# Patient Record
Sex: MALE | Race: WHITE
[De-identification: names, ages, dates, MRNs, and addresses within clinical notes are randomized per-mention and may not be internally consistent; named-entity substitution may affect disease eponyms.]

---

## 2019-01-23 ENCOUNTER — HOSPITAL ENCOUNTER (INPATIENT)
Dept: HOSPITAL 84 - D.ICU | Age: 84
LOS: 13 days | Discharge: NURSING, REHAB OR LONG-TERM CARE | DRG: 64 | End: 2019-02-05
Attending: FAMILY MEDICINE | Admitting: FAMILY MEDICINE
Payer: MEDICARE

## 2019-01-23 VITALS — DIASTOLIC BLOOD PRESSURE: 70 MMHG | SYSTOLIC BLOOD PRESSURE: 113 MMHG

## 2019-01-23 VITALS — DIASTOLIC BLOOD PRESSURE: 64 MMHG | SYSTOLIC BLOOD PRESSURE: 101 MMHG

## 2019-01-23 VITALS — SYSTOLIC BLOOD PRESSURE: 116 MMHG | DIASTOLIC BLOOD PRESSURE: 81 MMHG

## 2019-01-23 VITALS
HEIGHT: 70 IN | WEIGHT: 201.01 LBS | HEIGHT: 70 IN | BODY MASS INDEX: 28.78 KG/M2 | BODY MASS INDEX: 28.78 KG/M2 | BODY MASS INDEX: 28.78 KG/M2 | BODY MASS INDEX: 28.78 KG/M2 | WEIGHT: 201.01 LBS

## 2019-01-23 VITALS — SYSTOLIC BLOOD PRESSURE: 108 MMHG | DIASTOLIC BLOOD PRESSURE: 71 MMHG

## 2019-01-23 VITALS — SYSTOLIC BLOOD PRESSURE: 105 MMHG | DIASTOLIC BLOOD PRESSURE: 73 MMHG

## 2019-01-23 VITALS — SYSTOLIC BLOOD PRESSURE: 113 MMHG | DIASTOLIC BLOOD PRESSURE: 70 MMHG

## 2019-01-23 DIAGNOSIS — N18.9: ICD-10-CM

## 2019-01-23 DIAGNOSIS — J90: ICD-10-CM

## 2019-01-23 DIAGNOSIS — Z66: ICD-10-CM

## 2019-01-23 DIAGNOSIS — I27.20: ICD-10-CM

## 2019-01-23 DIAGNOSIS — Z95.0: ICD-10-CM

## 2019-01-23 DIAGNOSIS — G93.40: ICD-10-CM

## 2019-01-23 DIAGNOSIS — G81.94: ICD-10-CM

## 2019-01-23 DIAGNOSIS — I63.9: ICD-10-CM

## 2019-01-23 DIAGNOSIS — I71.2: ICD-10-CM

## 2019-01-23 DIAGNOSIS — I95.9: ICD-10-CM

## 2019-01-23 DIAGNOSIS — J44.9: ICD-10-CM

## 2019-01-23 DIAGNOSIS — N17.9: ICD-10-CM
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
## 2019-01-23 DIAGNOSIS — I60.9: Primary | ICD-10-CM

## 2019-01-23 DIAGNOSIS — I48.91: ICD-10-CM

## 2019-01-23 DIAGNOSIS — G47.30: ICD-10-CM

## 2019-01-23 DIAGNOSIS — I25.10: ICD-10-CM

## 2019-01-23 DIAGNOSIS — J96.21: ICD-10-CM

## 2019-01-23 DIAGNOSIS — I13.0: ICD-10-CM

## 2019-01-23 DIAGNOSIS — G47.33: ICD-10-CM

## 2019-01-23 DIAGNOSIS — M19.90: ICD-10-CM
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
## 2019-01-23 LAB
ALBUMIN SERPL-MCNC: 3 G/DL (ref 3.4–5)
ALP SERPL-CCNC: 75 U/L (ref 46–116)
ALT SERPL-CCNC: 15 U/L (ref 10–68)
ANION GAP SERPL CALC-SCNC: 6.2 MMOL/L (ref 8–16)
BASOPHILS NFR BLD AUTO: 0.2 % (ref 0–2)
BILIRUB SERPL-MCNC: 1.12 MG/DL (ref 0.2–1.3)
BUN SERPL-MCNC: 21 MG/DL (ref 7–18)
CALCIUM SERPL-MCNC: 9 MG/DL (ref 8.5–10.1)
CHLORIDE SERPL-SCNC: 96 MMOL/L (ref 98–107)
CO2 SERPL-SCNC: 39.4 MMOL/L (ref 21–32)
CREAT SERPL-MCNC: 1 MG/DL (ref 0.6–1.3)
EOSINOPHIL NFR BLD: 0 % (ref 0–7)
ERYTHROCYTE [DISTWIDTH] IN BLOOD BY AUTOMATED COUNT: 14.6 % (ref 11.5–14.5)
GLOBULIN SER-MCNC: 3.8 G/L
GLUCOSE SERPL-MCNC: 104 MG/DL (ref 74–106)
HCT VFR BLD CALC: 34.3 % (ref 42–54)
HGB BLD-MCNC: 10.9 G/DL (ref 13.5–17.5)
IMM GRANULOCYTES NFR BLD: 0.3 % (ref 0–5)
INR PPP: 2.03 (ref 0.85–1.17)
LYMPHOCYTES NFR BLD AUTO: 9.2 % (ref 15–50)
MCH RBC QN AUTO: 31.4 PG (ref 26–34)
MCHC RBC AUTO-ENTMCNC: 31.8 G/DL (ref 31–37)
MCV RBC: 98.8 FL (ref 80–100)
MONOCYTES NFR BLD: 7.4 % (ref 2–11)
NEUTROPHILS NFR BLD AUTO: 82.9 % (ref 40–80)
OSMOLALITY SERPL CALC.SUM OF ELEC: 276 MOSM/KG (ref 275–300)
PLATELET # BLD: 128 10X3/UL (ref 130–400)
PMV BLD AUTO: 10.1 FL (ref 7.4–10.4)
POTASSIUM SERPL-SCNC: 4.6 MMOL/L (ref 3.5–5.1)
PROT SERPL-MCNC: 6.8 G/DL (ref 6.4–8.2)
PROTHROMBIN TIME: 22.3 SECONDS (ref 11.6–15)
RBC # BLD AUTO: 3.47 10X6/UL (ref 4.2–6.1)
SODIUM SERPL-SCNC: 137 MMOL/L (ref 136–145)
WBC # BLD AUTO: 6 10X3/UL (ref 4.8–10.8)

## 2019-01-23 NOTE — HP
PATIENT: LINA ROSA                                MEDICAL RECORD: [id number]
ACCOUNT: [id number]                                    LOCATION:[facility]   D.2315
: 37                                            ADMISSION DATE: 19
                                                         PCP: BIBI POLLARD DO     
 
                             HISTORY AND PHYSICAL EXAMINATION
 
 
DATE OF ADMISSION:  2018
 
HISTORY OF PRESENT ILLNESS:  Mr. Rosa is an 84-year-old white male that is
transferred here from [facility].  He apparently fell several days ago and
sustained quite a bit of bruising and was sore, was admitted for further
evaluation.  His initial CT head and neck were negative and earlier today, he
began complaining of increasing headache.  A repeat CT was performed, which
revealed a subacute arachnoid hemorrhage.  They spoke with on-call neurosurgery,
Dr. Trimble, who has agreed to see the patient in consultation.  He is admitted to
our service as a transfer.  Upon arrival, he is alert.  He denies much of a
headache.  He complains of no nausea.  He has bruising about the face. 
Neurologically, he appears intact without any gross focal deficits.  He was on
Coumadin prior to his fall at home and that has been held, but he is noted on
other significant finding on CT of the chest revealed a large right pleural
effusion and a 7 cm descending thoracic aortic aneurysm.
 
PAST MEDICAL HISTORY:  Significant for atrial fibrillation, congestive heart
failure, pulmonary hypertension, previous pacemaker, coronary artery disease,
abdominal aortic aneurysm repair, thoracic aneurysm, obstructive sleep apnea,
osteoarthritis, and BPH.
 
ALLERGIES:  ERYTHROMYCIN.
 
MEDICATIONS AT THE TIME OF TRANSFER:  Include:  Docusate 100 mg daily,
citalopram 20 mg a day and aspirin which we will hold, simvastatin 40 mg a day,
Advair 250/50 a puff b.i.d., spironolactone 25 mg b.i.d., metoprolol succinate
50 mg daily, lisinopril 10 mg a day, tramadol b.i.d. p.r.n., potassium 10 mEq
every day, ferrous sulfate 325 daily, Zetia 10 mg a day, furosemide 40 mg a day,
prednisone 20 mg a day and Flonase nasal spray.
 
FAMILY HISTORY:  Noncontributory.
 
SOCIAL HISTORY:  The patient lives in [facility] between the [place]
and [place].  He does not smoke or drink.
 
REVIEW OF SYSTEMS:  Complains of a little headache, no visual problems.  He
complains of some pain in his mid abdomen.  He has bruising about the face.  He
has some bruising in the toes of his left foot.
 
PHYSICAL EXAMINATION:
HEENT:  Head is normocephalic.  Bruising about the face is noted.  Tongue is in
the midline.  Pupils are equal and reactive, has had previous cataract
surgeries.
NECK:  Soft and supple.
HEART:  Regular.
LUNGS:  Bilateral breath sounds, diminished on the right.
ABDOMEN:  Soft.  There is some mid abdominal tenderness noted.
EXTREMITIES:  Lower extremities reveal some edema.  There is bruising of the
toes of the right foot.
NEUROLOGIC:  His  are equal.  There is no drift.  Pupils are equal and
 
 
 
HISTORY AND PHYSICAL                           [id number]    LINA ROSA        
 
 
reactive.  Moves all 4 extremities without weakness.
 
IMPRESSION:
1. Intracranial bleed secondary to fall.
2. Anticoagulation now on hold.
3. Atrial fibrillation.
4. Thoracic aneurysm.
5. History of congestive heart failure, appears stable.
6. Pulmonary hypertension.
7. Status post pacemaker placement.
8. Coronary artery disease.
9. Sleep apnea.
10. Osteoarthritis.
 
PLAN:  Monitor in ICU.  Consult Dr. Trimble, who I discussed the case with.  We
will start 24 hours of IV Decadron and switch to Medrol Dosepak.  Follow up for
neurological change.  Continue with home meds for now.  Repeat chest x-ray and
follow effusion.  See orders for rest of plan.
 
TRANSINT:[id number] Voice Confirmation ID: [id number] DOCUMENT ID: [id number]
 
 
                                           
                                           BIBI POLLARD DO            
 
 
 
Electronically Signed by BIBI BRIGGS on 19 at 2313
 
 
 
 
 
 
 
 
 
 
 
 
 
 
 
 
 
 
 
CC:                                                             [id number]
DICTATION DATE: 19     :     19      ADM IN  
                                                                              
[address] [place] [address]

## 2019-01-23 NOTE — CN
PATIENT NAME:LINA ROSA                              MEDICAL RECORD: [id number]
: 37                                              LOCATION:[facility].2315
ADMIT DATE: 19                                       ACCOUNT: [id number]
CONSULTING PHYSICIAN:    RALPH MARVIN MD              
                                               
REFERRING PHYSICIAN:     BIBI POLLARD DO            
 
 
DATE OF CONSULTATION:  2019
 
CONSULT REQUESTING PHYSICIAN:  Bibi Pollard DO
 
REASON FOR CONSULTATION:  Bilateral pleural effusion, right more than the left.
 
HISTORY OF PRESENT ILLNESS:  Mr. Rosa is an 84-year-old gentleman who was
transferred from [place] for subarachnoid hemorrhage.  Workup showed that he has
also large right-sided pleural effusion.  He does have shortness of breath with
exertion and he required 4 liters of oxygen.  Denies any fever or chills, no
night sweats, no chest pain.
 
REVIEW OF SYSTEMS:  As in history of present illness.
 
PAST MEDICAL HISTORY:
1.  Congestive heart failure.
2.  Hypertension.
3.  Pulmonary hypertension.
4.  Coronary artery disease.
 
PAST SURGICAL HISTORY:
1.  He has abdominal aortic aneurysm repair.
2.  Thoracic aneurysm.
3.  He has obstructive sleep apnea.
4.  Osteoarthritis.
 
ALLERGIES:  HE IS ALLERGIC TO ERYTHROMYCIN.
 
MEDICATIONS:  [email or web address] is reviewed.
 
PERSONAL AND SOCIAL HISTORY:  The patient is a nonsmoker, nondrinker.
 
FAMILY HISTORY:  Noncontributory.
 
PHYSICAL EXAMINATION:
GENERAL:  Now, the patient is lying comfortably in bed.  He is not in acute
distress.
VITAL SIGNS:  The blood pressure is 93/74, pulse is 59, respirations is 12, SpO2
97% on 4 liters nasal cannula.
HEENT:  Conjunctivae are pink.  Sclerae are not icteric.  There are bruise all
over his face.
NECK:  Supple, no JVD.
CHEST:  There is decreased breath sound at the right base, dullness on
percussion.  There are also crackles.
HEART:  Rate and rhythm is irregular.  Normal sound.  No murmur.
ABDOMEN:  Soft, bowel sounds present.  No hepatosplenomegaly.
RECTAL:  Deferred.
EXTREMITIES:  No cyanosis, no clubbing.  A 1+ pedal edema.
CENTRAL NERVOUS SYSTEM:  The patient is awake and alert.  There is no obvious
cranial nerve abnormality.  The gait was not tested.
 
 
 
CONSULT REPORT                                 [id number]    LINA ROSA            
 
 
 
IMAGING:  Chest radiograph:  CT scan of the chest; there is a large right-sided
pleural effusion, small on the left side.  There is compressive atelectasis,
possible pneumonia.  There is thoracic aortic aneurysm, which is 6.5 cm in size.
 There is also nodular contour of the liver.  There is a 1 cm hyperdense right
renal lesion.
 
OTHER LABORATORY DATA:  INR on admission was 2.03, CBC:  WBC is 6.1, hemoglobin
10.6, hematocrit 32.9, the platelet count is 136.
 
IMPRESSION:
1.  Acute-on-chronic hypoxic respiratory failure.
2.  Pneumonia, right lower lobe.
3.  Right more than left pleural effusion, questionable parapneumonic, most
likely secondary to congestive heart failure.
4.  Subarachnoid hemorrhage.
5.  Atrial fibrillation.
6.  Obstructive sleep apnea.
7.  History of congestive heart failure.
8.  Secondary pulmonary hypertension, secondary to congestive heart failure.
 
RECOMMENDATION:
1.  We will proceed with thoracentesis.
2.  Start on empiric antibiotic.
3.  Supplemental oxygen.
4.  Follow up labs and chest radiograph.
5.  The patient can use the BiPAP.
6.  Discussed with Dr. Pollard.
 
Dr. Pollard, thank you for involving me in the care of Mr. Rosa.
 
TRANSINT:[id number] Voice Confirmation ID: [id number] DOCUMENT ID: [id number]
                                           
                                           RALPH MARVIN MD              
 
 
 
 
 
 
 
 
 
 
 
 
 
CC:                                                             [id number]
DICTATION DATE: 19     :     19      ADM IN  
                                                                              
[facility]                                          
[address] [place] [address]

## 2019-01-24 VITALS — DIASTOLIC BLOOD PRESSURE: 71 MMHG | SYSTOLIC BLOOD PRESSURE: 97 MMHG

## 2019-01-24 VITALS — DIASTOLIC BLOOD PRESSURE: 70 MMHG | SYSTOLIC BLOOD PRESSURE: 95 MMHG

## 2019-01-24 VITALS — SYSTOLIC BLOOD PRESSURE: 73 MMHG | DIASTOLIC BLOOD PRESSURE: 49 MMHG

## 2019-01-24 VITALS — DIASTOLIC BLOOD PRESSURE: 69 MMHG | SYSTOLIC BLOOD PRESSURE: 102 MMHG

## 2019-01-24 VITALS — SYSTOLIC BLOOD PRESSURE: 80 MMHG | DIASTOLIC BLOOD PRESSURE: 50 MMHG

## 2019-01-24 VITALS — SYSTOLIC BLOOD PRESSURE: 91 MMHG | DIASTOLIC BLOOD PRESSURE: 55 MMHG

## 2019-01-24 VITALS — DIASTOLIC BLOOD PRESSURE: 99 MMHG | SYSTOLIC BLOOD PRESSURE: 114 MMHG

## 2019-01-24 VITALS — DIASTOLIC BLOOD PRESSURE: 68 MMHG | SYSTOLIC BLOOD PRESSURE: 90 MMHG

## 2019-01-24 VITALS — DIASTOLIC BLOOD PRESSURE: 59 MMHG | SYSTOLIC BLOOD PRESSURE: 97 MMHG

## 2019-01-24 VITALS — DIASTOLIC BLOOD PRESSURE: 60 MMHG | SYSTOLIC BLOOD PRESSURE: 98 MMHG

## 2019-01-24 VITALS — SYSTOLIC BLOOD PRESSURE: 96 MMHG | DIASTOLIC BLOOD PRESSURE: 88 MMHG

## 2019-01-24 VITALS — DIASTOLIC BLOOD PRESSURE: 53 MMHG | SYSTOLIC BLOOD PRESSURE: 97 MMHG

## 2019-01-24 VITALS — DIASTOLIC BLOOD PRESSURE: 64 MMHG | SYSTOLIC BLOOD PRESSURE: 93 MMHG

## 2019-01-24 VITALS — SYSTOLIC BLOOD PRESSURE: 114 MMHG | DIASTOLIC BLOOD PRESSURE: 83 MMHG

## 2019-01-24 VITALS — DIASTOLIC BLOOD PRESSURE: 65 MMHG | SYSTOLIC BLOOD PRESSURE: 101 MMHG

## 2019-01-24 VITALS — SYSTOLIC BLOOD PRESSURE: 109 MMHG | DIASTOLIC BLOOD PRESSURE: 53 MMHG

## 2019-01-24 VITALS — DIASTOLIC BLOOD PRESSURE: 70 MMHG | SYSTOLIC BLOOD PRESSURE: 106 MMHG

## 2019-01-24 VITALS — SYSTOLIC BLOOD PRESSURE: 110 MMHG | DIASTOLIC BLOOD PRESSURE: 65 MMHG

## 2019-01-24 NOTE — MORECARE
CASE MANAGEMENT DISCHARGE SUMMARY
 
 
PATIENT: LINA HUTCHISON                    UNIT: [id number]
ACCOUNT#: [id number]                       ADM DATE: 19
AGE: 84     : 37  SEX: M            ROOM/BED: D.2315    
AUTHOR: CONNIE LOCK                             PHYSICIAN:                               
 
REFERRING PHYSICIAN: BIBI POLLARD DO            
DATE OF SERVICE: 19
Discharge Plan
 
 
Patient Name: LINA HUTCHISON
Facility: [facility]:[place]
Encounter #: [id number]
Medical Record #: [id number]
: 1935
Planned Disposition: 
Anticipated Discharge Date: 
 
Discharge Date: 
Expected LOS: 
Initial Reviewer: [email or web address]
Initial Review Date: 2019
Generated: 19  11:52 pm 
  
 
 
 
 
 
 
 
 
Last DP export: 19   9:46 p
Patient Name: LINA HUTCHISON
 
Encounter #: [id number]
Page [id number]
 
 
 
 
 
Electronically Signed by CONNIE LOCK on 19 at 2253
 
 
 
 
 
 
**All edits/amendments must be made on the electronic document**
 
DICTATION DATE: 19     : SAVI  19     
RPT#: [id number]                                DC DATE:        
                                               STATUS: ADM IN  
[facility]
[address] [place] [address]
***END OF REPORT***

## 2019-01-24 NOTE — MORECARE
CASE MANAGEMENT DISCHARGE SUMMARY
 
 
PATIENT: LINA HUTCHISON                    UNIT: [id number]
ACCOUNT#: [id number]                       ADM DATE: 19
AGE: 84     : 37  SEX: M            ROOM/BED: D.2315    
AUTHOR: CONNIE LOCK                             PHYSICIAN:                               
 
REFERRING PHYSICIAN: BIBI POLLARD DO            
DATE OF SERVICE: 19
Discharge Plan
 
 
Patient Name: LINA HUTCHISON
Facility: [facility]:[place]
Encounter #: [id number]
Medical Record #: [id number]
: 1935
Planned Disposition: 
Anticipated Discharge Date: 
 
Discharge Date: 
Expected LOS: 
Initial Reviewer: [email or web address]
Initial Review Date: 2019
Generated: 19  11:46 pm 
  
 
 
 
 
 
 
 
Patient Name: LINA HUTCHISON
 
Encounter #: [id number]
Page [id number]
 
 
 
 
 
Electronically Signed by CONNIE LOCK on 19 at 2246
 
 
 
 
 
 
**All edits/amendments must be made on the electronic document**
 
DICTATION DATE: 19     : SAVI  19     
RPT#: [id number]                                DC DATE:        
                                               STATUS: ADM IN  
[facility]
[address] [place] [address]
***END OF REPORT***

## 2019-01-24 NOTE — MORECARE
CASE MANAGEMENT DISCHARGE SUMMARY
 
 
PATIENT: LINA HUTCHISON                    UNIT: [id number]
ACCOUNT#: [id number]                       ADM DATE: 19
AGE: 84     : 37  SEX: M            ROOM/BED: D.2315    
AUTHOR: HAYDEDOC                             PHYSICIAN:                               
 
REFERRING PHYSICIAN: BIBI POLLARD DO            
DATE OF SERVICE: 19
Discharge Plan
 
 
Patient Name: LINA HUTCHISON
Facility: [facility]:[place]
Encounter #: [id number]
Medical Record #: [id number]
: 1935
Planned Disposition: 
Anticipated Discharge Date: 
 
Discharge Date: 
Expected LOS: 
Initial Reviewer: [email or web address]
Initial Review Date: 2019
Generated: 19  11:59 pm 
Comments
 
DCP- Discharge Planning
 
Updated by [id number]: Avelina Kaminski on 19   9:56 pm CT
Patient Name: LINA HUTCHISON                                     
Admission Status: Urgent   
Accout number: [id number]                              
Admission Date: 2019   
: 1935                                                        
Admission Diagnosis:   
Attending: BIBI POLLARD                                                
Current LOS:  1   
  
Anticipated DC Date:    
Planned Disposition:    
Primary Insurance: UNINSURED DISCOUNT PLAN   
  
  
Discharge Planning Comments: CM met with patient and spouse at bedside after 
obtaining verbal consent.  Patient states he plans on returning home after 
discharge with his wife. Patient states he does have [facility] and 
plans on resuming care when discharged.  Patient states he will have family 
transport him home via private vehicle.  Patient denies any discharge needs 
 
at this time. CM will continue to follow and assist as needed for discharge 
planning / needs.  
  
  
  
  
  
  
: Avelina Kaminski
 DCPIA - Discharge Planning Initial Assessment
 
Updated by [email or web address]: Avelina Kaminski on 19  10:54 pm
*  Is the patient Alert and Oriented?
Yes
*  How many steps to enter\exit or inside your home? ramp *  PCP DR. [facility]ANG - [place]
[facility]
*  [facility] [place]
 
*  Preadmission Environment
Home with Family
*  ADLs
Independent
*  Other Equipment
W/C, WALKER, HOSPITAL BED, 02, WALK-IN TUB, GRAB BARS
*  List name and contact numbers for known caregivers / representatives who 
currently or will assist patient after discharge:
RICHARD HUTCHISON - WIFE- [phone]
*  Verbal permission to speak to the caregivers and representatives has been 
obtained from the patient.
Yes
*  Community resources currently utilized
Home Health
*  Please name any agencies selected above.
[facility] [place]
*  Additional services required to return to the preadmission environment?
No
*  Can the patient safely return to the preadmission environment?
Yes
*  Has this patient been hospitalized within the prior 30 days at any 
hospital?
No
 
 
 
 
 
 
 
Last DP export: 19   9:52 p
Patient Name: LINA HUTCHISON
 
Encounter #: [id number]
Page [id number]
 
 
 
 
 
Electronically Signed by CONNIE LOCK on 19 at 2259
 
 
 
 
 
 
**All edits/amendments must be made on the electronic document**
 
DICTATION DATE: 19     : SAVI  19     
RPT#: [id number]                                DC DATE:        
                                               STATUS: ADM IN  
[facility]
 [place] [address]
***END OF REPORT***

## 2019-01-25 VITALS — DIASTOLIC BLOOD PRESSURE: 60 MMHG | SYSTOLIC BLOOD PRESSURE: 94 MMHG

## 2019-01-25 VITALS — SYSTOLIC BLOOD PRESSURE: 94 MMHG | DIASTOLIC BLOOD PRESSURE: 57 MMHG

## 2019-01-25 VITALS — SYSTOLIC BLOOD PRESSURE: 98 MMHG | DIASTOLIC BLOOD PRESSURE: 61 MMHG

## 2019-01-25 VITALS — DIASTOLIC BLOOD PRESSURE: 52 MMHG | SYSTOLIC BLOOD PRESSURE: 85 MMHG

## 2019-01-25 VITALS — DIASTOLIC BLOOD PRESSURE: 64 MMHG | SYSTOLIC BLOOD PRESSURE: 100 MMHG

## 2019-01-25 VITALS — DIASTOLIC BLOOD PRESSURE: 68 MMHG | SYSTOLIC BLOOD PRESSURE: 108 MMHG

## 2019-01-25 VITALS — SYSTOLIC BLOOD PRESSURE: 101 MMHG | DIASTOLIC BLOOD PRESSURE: 63 MMHG

## 2019-01-25 VITALS — SYSTOLIC BLOOD PRESSURE: 78 MMHG | DIASTOLIC BLOOD PRESSURE: 49 MMHG

## 2019-01-25 VITALS — DIASTOLIC BLOOD PRESSURE: 54 MMHG | SYSTOLIC BLOOD PRESSURE: 96 MMHG

## 2019-01-25 VITALS — SYSTOLIC BLOOD PRESSURE: 95 MMHG | DIASTOLIC BLOOD PRESSURE: 46 MMHG

## 2019-01-25 VITALS — DIASTOLIC BLOOD PRESSURE: 74 MMHG | SYSTOLIC BLOOD PRESSURE: 93 MMHG

## 2019-01-25 VITALS — DIASTOLIC BLOOD PRESSURE: 80 MMHG | SYSTOLIC BLOOD PRESSURE: 105 MMHG

## 2019-01-25 VITALS — DIASTOLIC BLOOD PRESSURE: 63 MMHG | SYSTOLIC BLOOD PRESSURE: 96 MMHG

## 2019-01-25 VITALS — SYSTOLIC BLOOD PRESSURE: 101 MMHG | DIASTOLIC BLOOD PRESSURE: 59 MMHG

## 2019-01-25 VITALS — SYSTOLIC BLOOD PRESSURE: 87 MMHG | DIASTOLIC BLOOD PRESSURE: 53 MMHG

## 2019-01-25 VITALS — DIASTOLIC BLOOD PRESSURE: 53 MMHG | SYSTOLIC BLOOD PRESSURE: 87 MMHG

## 2019-01-25 LAB
ANION GAP SERPL CALC-SCNC: 5.7 MMOL/L (ref 8–16)
BASOPHILS NFR BLD AUTO: 0 % (ref 0–2)
BUN SERPL-MCNC: 25 MG/DL (ref 7–18)
CALCIUM SERPL-MCNC: 8.7 MG/DL (ref 8.5–10.1)
CHLORIDE SERPL-SCNC: 96 MMOL/L (ref 98–107)
CO2 SERPL-SCNC: 39.4 MMOL/L (ref 21–32)
CREAT SERPL-MCNC: 1.1 MG/DL (ref 0.6–1.3)
EOSINOPHIL NFR BLD: 0 % (ref 0–7)
ERYTHROCYTE [DISTWIDTH] IN BLOOD BY AUTOMATED COUNT: 14 % (ref 11.5–14.5)
GLUCOSE - BODY FLUID: 115 MG/DL
GLUCOSE SERPL-MCNC: 110 MG/DL (ref 74–106)
HCT VFR BLD CALC: 32.9 % (ref 42–54)
HGB BLD-MCNC: 10.6 G/DL (ref 13.5–17.5)
IMM GRANULOCYTES NFR BLD: 0.2 % (ref 0–5)
INR PPP: 1.4 (ref 0.85–1.17)
LYMPHOCYTES NFR BLD AUTO: 7.7 % (ref 15–50)
MACROPHAGES NFR FLD MANUAL: 35 %
MCH RBC QN AUTO: 31.5 PG (ref 26–34)
MCHC RBC AUTO-ENTMCNC: 32.2 G/DL (ref 31–37)
MCV RBC: 97.6 FL (ref 80–100)
MESOTHL CELL NFR FLD MANUAL: 6 %
MONOCYTES NFR BLD: 6.2 % (ref 2–11)
NEUTROPHILS NFR BLD AUTO: 85.9 % (ref 40–80)
NEUTROPHILS NFR FLD MANUAL: 22 %
OSMOLALITY SERPL CALC.SUM OF ELEC: 278 MOSM/KG (ref 275–300)
PLATELET # BLD: 136 10X3/UL (ref 130–400)
PMV BLD AUTO: 9.5 FL (ref 7.4–10.4)
POTASSIUM SERPL-SCNC: 4.1 MMOL/L (ref 3.5–5.1)
PROTEIN - BODY FLUID: 2.7 G/DL
PROTHROMBIN TIME: 16.6 SECONDS (ref 11.6–15)
RBC # BLD AUTO: 3.37 10X6/UL (ref 4.2–6.1)
SODIUM SERPL-SCNC: 137 MMOL/L (ref 136–145)
WBC # BLD AUTO: 6.1 10X3/UL (ref 4.8–10.8)

## 2019-01-25 PROCEDURE — 0W993ZZ DRAINAGE OF RIGHT PLEURAL CAVITY, PERCUTANEOUS APPROACH: ICD-10-PCS | Performed by: RADIOLOGY

## 2019-01-26 VITALS — SYSTOLIC BLOOD PRESSURE: 102 MMHG | DIASTOLIC BLOOD PRESSURE: 64 MMHG

## 2019-01-26 VITALS — DIASTOLIC BLOOD PRESSURE: 59 MMHG | SYSTOLIC BLOOD PRESSURE: 85 MMHG

## 2019-01-26 VITALS — SYSTOLIC BLOOD PRESSURE: 94 MMHG | DIASTOLIC BLOOD PRESSURE: 56 MMHG

## 2019-01-26 VITALS — SYSTOLIC BLOOD PRESSURE: 86 MMHG | DIASTOLIC BLOOD PRESSURE: 57 MMHG

## 2019-01-26 VITALS — DIASTOLIC BLOOD PRESSURE: 63 MMHG | SYSTOLIC BLOOD PRESSURE: 105 MMHG

## 2019-01-26 VITALS — SYSTOLIC BLOOD PRESSURE: 66 MMHG | DIASTOLIC BLOOD PRESSURE: 46 MMHG

## 2019-01-26 VITALS — SYSTOLIC BLOOD PRESSURE: 99 MMHG | DIASTOLIC BLOOD PRESSURE: 64 MMHG

## 2019-01-26 VITALS — DIASTOLIC BLOOD PRESSURE: 69 MMHG | SYSTOLIC BLOOD PRESSURE: 108 MMHG

## 2019-01-26 VITALS — DIASTOLIC BLOOD PRESSURE: 45 MMHG | SYSTOLIC BLOOD PRESSURE: 72 MMHG

## 2019-01-26 VITALS — SYSTOLIC BLOOD PRESSURE: 100 MMHG | DIASTOLIC BLOOD PRESSURE: 65 MMHG

## 2019-01-26 VITALS — DIASTOLIC BLOOD PRESSURE: 61 MMHG | SYSTOLIC BLOOD PRESSURE: 96 MMHG

## 2019-01-26 VITALS — SYSTOLIC BLOOD PRESSURE: 78 MMHG | DIASTOLIC BLOOD PRESSURE: 47 MMHG

## 2019-01-26 VITALS — SYSTOLIC BLOOD PRESSURE: 95 MMHG | DIASTOLIC BLOOD PRESSURE: 84 MMHG

## 2019-01-26 VITALS — SYSTOLIC BLOOD PRESSURE: 84 MMHG | DIASTOLIC BLOOD PRESSURE: 49 MMHG

## 2019-01-26 VITALS — DIASTOLIC BLOOD PRESSURE: 72 MMHG | SYSTOLIC BLOOD PRESSURE: 113 MMHG

## 2019-01-26 VITALS — DIASTOLIC BLOOD PRESSURE: 57 MMHG | SYSTOLIC BLOOD PRESSURE: 94 MMHG

## 2019-01-26 VITALS — SYSTOLIC BLOOD PRESSURE: 100 MMHG | DIASTOLIC BLOOD PRESSURE: 56 MMHG

## 2019-01-26 VITALS — DIASTOLIC BLOOD PRESSURE: 55 MMHG | SYSTOLIC BLOOD PRESSURE: 92 MMHG

## 2019-01-26 VITALS — SYSTOLIC BLOOD PRESSURE: 107 MMHG | DIASTOLIC BLOOD PRESSURE: 74 MMHG

## 2019-01-26 VITALS — SYSTOLIC BLOOD PRESSURE: 110 MMHG | DIASTOLIC BLOOD PRESSURE: 61 MMHG

## 2019-01-26 VITALS — DIASTOLIC BLOOD PRESSURE: 60 MMHG | SYSTOLIC BLOOD PRESSURE: 94 MMHG

## 2019-01-26 VITALS — SYSTOLIC BLOOD PRESSURE: 89 MMHG | DIASTOLIC BLOOD PRESSURE: 61 MMHG

## 2019-01-26 VITALS — SYSTOLIC BLOOD PRESSURE: 109 MMHG | DIASTOLIC BLOOD PRESSURE: 69 MMHG

## 2019-01-26 LAB
ALBUMIN SERPL-MCNC: 2.8 G/DL (ref 3.4–5)
ALP SERPL-CCNC: 67 U/L (ref 46–116)
ALT SERPL-CCNC: 16 U/L (ref 10–68)
ANION GAP SERPL CALC-SCNC: 6 MMOL/L (ref 8–16)
BASOPHILS NFR BLD AUTO: 0 % (ref 0–2)
BILIRUB SERPL-MCNC: 1.35 MG/DL (ref 0.2–1.3)
BUN SERPL-MCNC: 32 MG/DL (ref 7–18)
CALCIUM SERPL-MCNC: 8.9 MG/DL (ref 8.5–10.1)
CHLORIDE SERPL-SCNC: 94 MMOL/L (ref 98–107)
CO2 SERPL-SCNC: 42.8 MMOL/L (ref 21–32)
CREAT SERPL-MCNC: 1.3 MG/DL (ref 0.6–1.3)
EOSINOPHIL NFR BLD: 0 % (ref 0–7)
ERYTHROCYTE [DISTWIDTH] IN BLOOD BY AUTOMATED COUNT: 14 % (ref 11.5–14.5)
GLOBULIN SER-MCNC: 3.2 G/L
GLUCOSE SERPL-MCNC: 83 MG/DL (ref 74–106)
HCT VFR BLD CALC: 36 % (ref 42–54)
HGB BLD-MCNC: 11.9 G/DL (ref 13.5–17.5)
IMM GRANULOCYTES NFR BLD: 0.2 % (ref 0–5)
LYMPHOCYTES NFR BLD AUTO: 10.4 % (ref 15–50)
MCH RBC QN AUTO: 31.6 PG (ref 26–34)
MCHC RBC AUTO-ENTMCNC: 33.1 G/DL (ref 31–37)
MCV RBC: 95.7 FL (ref 80–100)
MONOCYTES NFR BLD: 11.4 % (ref 2–11)
NEUTROPHILS NFR BLD AUTO: 78 % (ref 40–80)
OSMOLALITY SERPL CALC.SUM OF ELEC: 283 MOSM/KG (ref 275–300)
PLATELET # BLD: 144 10X3/UL (ref 130–400)
PMV BLD AUTO: 9.3 FL (ref 7.4–10.4)
POTASSIUM SERPL-SCNC: 3.8 MMOL/L (ref 3.5–5.1)
PROT SERPL-MCNC: 6 G/DL (ref 6.4–8.2)
RBC # BLD AUTO: 3.76 10X6/UL (ref 4.2–6.1)
SODIUM SERPL-SCNC: 139 MMOL/L (ref 136–145)
SPECIMEN PREPARATION: (no result)
WBC # BLD AUTO: 8.9 10X3/UL (ref 4.8–10.8)

## 2019-01-27 VITALS — DIASTOLIC BLOOD PRESSURE: 66 MMHG | SYSTOLIC BLOOD PRESSURE: 98 MMHG

## 2019-01-27 VITALS — DIASTOLIC BLOOD PRESSURE: 63 MMHG | SYSTOLIC BLOOD PRESSURE: 97 MMHG

## 2019-01-27 VITALS — DIASTOLIC BLOOD PRESSURE: 56 MMHG | SYSTOLIC BLOOD PRESSURE: 96 MMHG

## 2019-01-27 VITALS — DIASTOLIC BLOOD PRESSURE: 59 MMHG | SYSTOLIC BLOOD PRESSURE: 93 MMHG

## 2019-01-27 VITALS — SYSTOLIC BLOOD PRESSURE: 97 MMHG | DIASTOLIC BLOOD PRESSURE: 58 MMHG

## 2019-01-27 VITALS — SYSTOLIC BLOOD PRESSURE: 93 MMHG | DIASTOLIC BLOOD PRESSURE: 74 MMHG

## 2019-01-27 VITALS — DIASTOLIC BLOOD PRESSURE: 70 MMHG | SYSTOLIC BLOOD PRESSURE: 93 MMHG

## 2019-01-27 VITALS — SYSTOLIC BLOOD PRESSURE: 94 MMHG | DIASTOLIC BLOOD PRESSURE: 61 MMHG

## 2019-01-27 VITALS — DIASTOLIC BLOOD PRESSURE: 64 MMHG | SYSTOLIC BLOOD PRESSURE: 96 MMHG

## 2019-01-27 VITALS — SYSTOLIC BLOOD PRESSURE: 93 MMHG | DIASTOLIC BLOOD PRESSURE: 64 MMHG

## 2019-01-27 VITALS — SYSTOLIC BLOOD PRESSURE: 102 MMHG | DIASTOLIC BLOOD PRESSURE: 69 MMHG

## 2019-01-27 VITALS — DIASTOLIC BLOOD PRESSURE: 60 MMHG | SYSTOLIC BLOOD PRESSURE: 109 MMHG

## 2019-01-27 VITALS — SYSTOLIC BLOOD PRESSURE: 100 MMHG | DIASTOLIC BLOOD PRESSURE: 60 MMHG

## 2019-01-27 VITALS — DIASTOLIC BLOOD PRESSURE: 64 MMHG | SYSTOLIC BLOOD PRESSURE: 95 MMHG

## 2019-01-27 VITALS — DIASTOLIC BLOOD PRESSURE: 62 MMHG | SYSTOLIC BLOOD PRESSURE: 97 MMHG

## 2019-01-27 VITALS — SYSTOLIC BLOOD PRESSURE: 83 MMHG | DIASTOLIC BLOOD PRESSURE: 59 MMHG

## 2019-01-27 VITALS — DIASTOLIC BLOOD PRESSURE: 76 MMHG | SYSTOLIC BLOOD PRESSURE: 105 MMHG

## 2019-01-27 VITALS — SYSTOLIC BLOOD PRESSURE: 99 MMHG | DIASTOLIC BLOOD PRESSURE: 77 MMHG

## 2019-01-27 VITALS — DIASTOLIC BLOOD PRESSURE: 62 MMHG | SYSTOLIC BLOOD PRESSURE: 92 MMHG

## 2019-01-27 VITALS — SYSTOLIC BLOOD PRESSURE: 105 MMHG | DIASTOLIC BLOOD PRESSURE: 34 MMHG

## 2019-01-27 VITALS — SYSTOLIC BLOOD PRESSURE: 114 MMHG | DIASTOLIC BLOOD PRESSURE: 81 MMHG

## 2019-01-27 VITALS — DIASTOLIC BLOOD PRESSURE: 62 MMHG | SYSTOLIC BLOOD PRESSURE: 102 MMHG

## 2019-01-27 VITALS — DIASTOLIC BLOOD PRESSURE: 56 MMHG | SYSTOLIC BLOOD PRESSURE: 98 MMHG

## 2019-01-27 VITALS — DIASTOLIC BLOOD PRESSURE: 85 MMHG | SYSTOLIC BLOOD PRESSURE: 104 MMHG

## 2019-01-27 LAB
ALBUMIN SERPL-MCNC: 2.8 G/DL (ref 3.4–5)
ALP SERPL-CCNC: 68 U/L (ref 46–116)
ALT SERPL-CCNC: 16 U/L (ref 10–68)
ANION GAP SERPL CALC-SCNC: 10 MMOL/L (ref 8–16)
BASOPHILS NFR BLD AUTO: 0 % (ref 0–2)
BILIRUB SERPL-MCNC: 1.61 MG/DL (ref 0.2–1.3)
BUN SERPL-MCNC: 33 MG/DL (ref 7–18)
CALCIUM SERPL-MCNC: 9.1 MG/DL (ref 8.5–10.1)
CHLORIDE SERPL-SCNC: 93 MMOL/L (ref 98–107)
CO2 SERPL-SCNC: 37.3 MMOL/L (ref 21–32)
CREAT SERPL-MCNC: 1.4 MG/DL (ref 0.6–1.3)
EOSINOPHIL NFR BLD: 0 % (ref 0–7)
ERYTHROCYTE [DISTWIDTH] IN BLOOD BY AUTOMATED COUNT: 13.8 % (ref 11.5–14.5)
GLOBULIN SER-MCNC: 3.8 G/L
GLUCOSE SERPL-MCNC: 101 MG/DL (ref 74–106)
HCT VFR BLD CALC: 38.2 % (ref 42–54)
HGB BLD-MCNC: 12.6 G/DL (ref 13.5–17.5)
IMM GRANULOCYTES NFR BLD: 0.3 % (ref 0–5)
LYMPHOCYTES NFR BLD AUTO: 8.7 % (ref 15–50)
MCH RBC QN AUTO: 31.3 PG (ref 26–34)
MCHC RBC AUTO-ENTMCNC: 33 G/DL (ref 31–37)
MCV RBC: 95 FL (ref 80–100)
MONOCYTES NFR BLD: 10.9 % (ref 2–11)
NEUTROPHILS NFR BLD AUTO: 80.1 % (ref 40–80)
OSMOLALITY SERPL CALC.SUM OF ELEC: 278 MOSM/KG (ref 275–300)
PLATELET # BLD: 159 10X3/UL (ref 130–400)
PMV BLD AUTO: 9.4 FL (ref 7.4–10.4)
POTASSIUM SERPL-SCNC: 4.3 MMOL/L (ref 3.5–5.1)
PROT SERPL-MCNC: 6.6 G/DL (ref 6.4–8.2)
RBC # BLD AUTO: 4.02 10X6/UL (ref 4.2–6.1)
SODIUM SERPL-SCNC: 136 MMOL/L (ref 136–145)
WBC # BLD AUTO: 7.9 10X3/UL (ref 4.8–10.8)

## 2019-01-28 VITALS — DIASTOLIC BLOOD PRESSURE: 73 MMHG | SYSTOLIC BLOOD PRESSURE: 102 MMHG

## 2019-01-28 VITALS — SYSTOLIC BLOOD PRESSURE: 99 MMHG | DIASTOLIC BLOOD PRESSURE: 78 MMHG

## 2019-01-28 VITALS — SYSTOLIC BLOOD PRESSURE: 90 MMHG | DIASTOLIC BLOOD PRESSURE: 58 MMHG

## 2019-01-28 VITALS — DIASTOLIC BLOOD PRESSURE: 60 MMHG | SYSTOLIC BLOOD PRESSURE: 96 MMHG

## 2019-01-28 VITALS — SYSTOLIC BLOOD PRESSURE: 94 MMHG | DIASTOLIC BLOOD PRESSURE: 60 MMHG

## 2019-01-28 VITALS — SYSTOLIC BLOOD PRESSURE: 103 MMHG | DIASTOLIC BLOOD PRESSURE: 85 MMHG

## 2019-01-28 VITALS — DIASTOLIC BLOOD PRESSURE: 73 MMHG | SYSTOLIC BLOOD PRESSURE: 99 MMHG

## 2019-01-28 VITALS — DIASTOLIC BLOOD PRESSURE: 56 MMHG | SYSTOLIC BLOOD PRESSURE: 82 MMHG

## 2019-01-28 VITALS — DIASTOLIC BLOOD PRESSURE: 84 MMHG | SYSTOLIC BLOOD PRESSURE: 99 MMHG

## 2019-01-28 VITALS — DIASTOLIC BLOOD PRESSURE: 63 MMHG | SYSTOLIC BLOOD PRESSURE: 93 MMHG

## 2019-01-28 VITALS — SYSTOLIC BLOOD PRESSURE: 93 MMHG | DIASTOLIC BLOOD PRESSURE: 61 MMHG

## 2019-01-28 VITALS — SYSTOLIC BLOOD PRESSURE: 100 MMHG | DIASTOLIC BLOOD PRESSURE: 63 MMHG

## 2019-01-28 VITALS — DIASTOLIC BLOOD PRESSURE: 63 MMHG | SYSTOLIC BLOOD PRESSURE: 87 MMHG

## 2019-01-28 VITALS — SYSTOLIC BLOOD PRESSURE: 99 MMHG | DIASTOLIC BLOOD PRESSURE: 74 MMHG

## 2019-01-28 VITALS — SYSTOLIC BLOOD PRESSURE: 94 MMHG | DIASTOLIC BLOOD PRESSURE: 63 MMHG

## 2019-01-28 VITALS — SYSTOLIC BLOOD PRESSURE: 102 MMHG | DIASTOLIC BLOOD PRESSURE: 73 MMHG

## 2019-01-28 VITALS — SYSTOLIC BLOOD PRESSURE: 97 MMHG | DIASTOLIC BLOOD PRESSURE: 57 MMHG

## 2019-01-28 VITALS — DIASTOLIC BLOOD PRESSURE: 66 MMHG | SYSTOLIC BLOOD PRESSURE: 107 MMHG

## 2019-01-28 VITALS — SYSTOLIC BLOOD PRESSURE: 87 MMHG | DIASTOLIC BLOOD PRESSURE: 62 MMHG

## 2019-01-28 VITALS — SYSTOLIC BLOOD PRESSURE: 97 MMHG | DIASTOLIC BLOOD PRESSURE: 65 MMHG

## 2019-01-28 VITALS — DIASTOLIC BLOOD PRESSURE: 62 MMHG | SYSTOLIC BLOOD PRESSURE: 92 MMHG

## 2019-01-28 VITALS — SYSTOLIC BLOOD PRESSURE: 101 MMHG | DIASTOLIC BLOOD PRESSURE: 82 MMHG

## 2019-01-28 VITALS — DIASTOLIC BLOOD PRESSURE: 57 MMHG | SYSTOLIC BLOOD PRESSURE: 87 MMHG

## 2019-01-28 VITALS — DIASTOLIC BLOOD PRESSURE: 61 MMHG | SYSTOLIC BLOOD PRESSURE: 83 MMHG

## 2019-01-28 LAB
ALBUMIN SERPL-MCNC: 3 G/DL (ref 3.4–5)
ALP SERPL-CCNC: 73 U/L (ref 46–116)
ALT SERPL-CCNC: 22 U/L (ref 10–68)
ANION GAP SERPL CALC-SCNC: 10.6 MMOL/L (ref 8–16)
BASOPHILS NFR BLD AUTO: 0.1 % (ref 0–2)
BILIRUB SERPL-MCNC: 2.07 MG/DL (ref 0.2–1.3)
BUN SERPL-MCNC: 37 MG/DL (ref 7–18)
CALCIUM SERPL-MCNC: 9.4 MG/DL (ref 8.5–10.1)
CHLORIDE SERPL-SCNC: 93 MMOL/L (ref 98–107)
CO2 SERPL-SCNC: 37.3 MMOL/L (ref 21–32)
CREAT SERPL-MCNC: 1.6 MG/DL (ref 0.6–1.3)
EOSINOPHIL NFR BLD: 0.7 % (ref 0–7)
ERYTHROCYTE [DISTWIDTH] IN BLOOD BY AUTOMATED COUNT: 14.2 % (ref 11.5–14.5)
GLOBULIN SER-MCNC: 4 G/L
GLUCOSE SERPL-MCNC: 78 MG/DL (ref 74–106)
HCT VFR BLD CALC: 41.6 % (ref 42–54)
HGB BLD-MCNC: 13.9 G/DL (ref 13.5–17.5)
IMM GRANULOCYTES NFR BLD: 0.4 % (ref 0–5)
LYMPHOCYTES NFR BLD AUTO: 17.2 % (ref 15–50)
MCH RBC QN AUTO: 31.7 PG (ref 26–34)
MCHC RBC AUTO-ENTMCNC: 33.4 G/DL (ref 31–37)
MCV RBC: 95 FL (ref 80–100)
MONOCYTES NFR BLD: 15.4 % (ref 2–11)
NEUTROPHILS NFR BLD AUTO: 66.2 % (ref 40–80)
OSMOLALITY SERPL CALC.SUM OF ELEC: 281 MOSM/KG (ref 275–300)
PLATELET # BLD: 196 10X3/UL (ref 130–400)
PMV BLD AUTO: 9.8 FL (ref 7.4–10.4)
POTASSIUM SERPL-SCNC: 3.9 MMOL/L (ref 3.5–5.1)
PROT SERPL-MCNC: 7 G/DL (ref 6.4–8.2)
RBC # BLD AUTO: 4.38 10X6/UL (ref 4.2–6.1)
SODIUM SERPL-SCNC: 137 MMOL/L (ref 136–145)
WBC # BLD AUTO: 8.3 10X3/UL (ref 4.8–10.8)

## 2019-01-29 VITALS — SYSTOLIC BLOOD PRESSURE: 94 MMHG | DIASTOLIC BLOOD PRESSURE: 62 MMHG

## 2019-01-29 VITALS — DIASTOLIC BLOOD PRESSURE: 60 MMHG | SYSTOLIC BLOOD PRESSURE: 98 MMHG

## 2019-01-29 VITALS — DIASTOLIC BLOOD PRESSURE: 57 MMHG | SYSTOLIC BLOOD PRESSURE: 88 MMHG

## 2019-01-29 VITALS — SYSTOLIC BLOOD PRESSURE: 95 MMHG | DIASTOLIC BLOOD PRESSURE: 63 MMHG

## 2019-01-29 VITALS — DIASTOLIC BLOOD PRESSURE: 53 MMHG | SYSTOLIC BLOOD PRESSURE: 88 MMHG

## 2019-01-29 VITALS — SYSTOLIC BLOOD PRESSURE: 83 MMHG | DIASTOLIC BLOOD PRESSURE: 56 MMHG

## 2019-01-29 VITALS — DIASTOLIC BLOOD PRESSURE: 62 MMHG | SYSTOLIC BLOOD PRESSURE: 97 MMHG

## 2019-01-29 VITALS — SYSTOLIC BLOOD PRESSURE: 100 MMHG | DIASTOLIC BLOOD PRESSURE: 63 MMHG

## 2019-01-29 VITALS — SYSTOLIC BLOOD PRESSURE: 102 MMHG | DIASTOLIC BLOOD PRESSURE: 77 MMHG

## 2019-01-29 VITALS — DIASTOLIC BLOOD PRESSURE: 60 MMHG | SYSTOLIC BLOOD PRESSURE: 100 MMHG

## 2019-01-29 VITALS — SYSTOLIC BLOOD PRESSURE: 86 MMHG | DIASTOLIC BLOOD PRESSURE: 56 MMHG

## 2019-01-29 VITALS — DIASTOLIC BLOOD PRESSURE: 61 MMHG | SYSTOLIC BLOOD PRESSURE: 92 MMHG

## 2019-01-29 VITALS — DIASTOLIC BLOOD PRESSURE: 62 MMHG | SYSTOLIC BLOOD PRESSURE: 99 MMHG

## 2019-01-29 VITALS — DIASTOLIC BLOOD PRESSURE: 57 MMHG | SYSTOLIC BLOOD PRESSURE: 94 MMHG

## 2019-01-29 VITALS — SYSTOLIC BLOOD PRESSURE: 98 MMHG | DIASTOLIC BLOOD PRESSURE: 50 MMHG

## 2019-01-29 VITALS — DIASTOLIC BLOOD PRESSURE: 59 MMHG | SYSTOLIC BLOOD PRESSURE: 93 MMHG

## 2019-01-29 VITALS — SYSTOLIC BLOOD PRESSURE: 91 MMHG | DIASTOLIC BLOOD PRESSURE: 60 MMHG

## 2019-01-29 VITALS — DIASTOLIC BLOOD PRESSURE: 61 MMHG | SYSTOLIC BLOOD PRESSURE: 96 MMHG

## 2019-01-29 VITALS — DIASTOLIC BLOOD PRESSURE: 50 MMHG | SYSTOLIC BLOOD PRESSURE: 78 MMHG

## 2019-01-29 VITALS — DIASTOLIC BLOOD PRESSURE: 59 MMHG | SYSTOLIC BLOOD PRESSURE: 96 MMHG

## 2019-01-29 VITALS — SYSTOLIC BLOOD PRESSURE: 91 MMHG | DIASTOLIC BLOOD PRESSURE: 57 MMHG

## 2019-01-29 VITALS — SYSTOLIC BLOOD PRESSURE: 83 MMHG | DIASTOLIC BLOOD PRESSURE: 53 MMHG

## 2019-01-29 LAB
ALBUMIN SERPL-MCNC: 3 G/DL (ref 3.4–5)
ALP SERPL-CCNC: 72 U/L (ref 46–116)
ALT SERPL-CCNC: 20 U/L (ref 10–68)
ANION GAP SERPL CALC-SCNC: 11.3 MMOL/L (ref 8–16)
BASOPHILS NFR BLD AUTO: 0 % (ref 0–2)
BILIRUB SERPL-MCNC: 1.9 MG/DL (ref 0.2–1.3)
BUN SERPL-MCNC: 42 MG/DL (ref 7–18)
CALCIUM SERPL-MCNC: 9.2 MG/DL (ref 8.5–10.1)
CHLORIDE SERPL-SCNC: 95 MMOL/L (ref 98–107)
CO2 SERPL-SCNC: 35.8 MMOL/L (ref 21–32)
CREAT SERPL-MCNC: 1.7 MG/DL (ref 0.6–1.3)
EOSINOPHIL NFR BLD: 0.1 % (ref 0–7)
ERYTHROCYTE [DISTWIDTH] IN BLOOD BY AUTOMATED COUNT: 13.9 % (ref 11.5–14.5)
GLOBULIN SER-MCNC: 4.1 G/L
GLUCOSE SERPL-MCNC: 97 MG/DL (ref 74–106)
HCT VFR BLD CALC: 44.4 % (ref 42–54)
HGB BLD-MCNC: 14.6 G/DL (ref 13.5–17.5)
IMM GRANULOCYTES NFR BLD: 0.4 % (ref 0–5)
LYMPHOCYTES NFR BLD AUTO: 10.3 % (ref 15–50)
MCH RBC QN AUTO: 31.7 PG (ref 26–34)
MCHC RBC AUTO-ENTMCNC: 32.9 G/DL (ref 31–37)
MCV RBC: 96.3 FL (ref 80–100)
MONOCYTES NFR BLD: 9.9 % (ref 2–11)
NEUTROPHILS NFR BLD AUTO: 79.3 % (ref 40–80)
OSMOLALITY SERPL CALC.SUM OF ELEC: 286 MOSM/KG (ref 275–300)
PLATELET # BLD: 173 10X3/UL (ref 130–400)
PMV BLD AUTO: 9.4 FL (ref 7.4–10.4)
POTASSIUM SERPL-SCNC: 4.1 MMOL/L (ref 3.5–5.1)
PROT SERPL-MCNC: 7.1 G/DL (ref 6.4–8.2)
RBC # BLD AUTO: 4.61 10X6/UL (ref 4.2–6.1)
SODIUM SERPL-SCNC: 138 MMOL/L (ref 136–145)
WBC # BLD AUTO: 8.1 10X3/UL (ref 4.8–10.8)

## 2019-01-30 VITALS — DIASTOLIC BLOOD PRESSURE: 58 MMHG | SYSTOLIC BLOOD PRESSURE: 95 MMHG

## 2019-01-30 VITALS — DIASTOLIC BLOOD PRESSURE: 64 MMHG | SYSTOLIC BLOOD PRESSURE: 95 MMHG

## 2019-01-30 VITALS — DIASTOLIC BLOOD PRESSURE: 46 MMHG | SYSTOLIC BLOOD PRESSURE: 106 MMHG

## 2019-01-30 VITALS — SYSTOLIC BLOOD PRESSURE: 96 MMHG | DIASTOLIC BLOOD PRESSURE: 68 MMHG

## 2019-01-30 VITALS — DIASTOLIC BLOOD PRESSURE: 54 MMHG | SYSTOLIC BLOOD PRESSURE: 86 MMHG

## 2019-01-30 VITALS — DIASTOLIC BLOOD PRESSURE: 53 MMHG | SYSTOLIC BLOOD PRESSURE: 87 MMHG

## 2019-01-30 VITALS — DIASTOLIC BLOOD PRESSURE: 63 MMHG | SYSTOLIC BLOOD PRESSURE: 100 MMHG

## 2019-01-30 VITALS — DIASTOLIC BLOOD PRESSURE: 55 MMHG | SYSTOLIC BLOOD PRESSURE: 83 MMHG

## 2019-01-30 VITALS — DIASTOLIC BLOOD PRESSURE: 61 MMHG | SYSTOLIC BLOOD PRESSURE: 97 MMHG

## 2019-01-30 VITALS — SYSTOLIC BLOOD PRESSURE: 100 MMHG | DIASTOLIC BLOOD PRESSURE: 64 MMHG

## 2019-01-30 VITALS — SYSTOLIC BLOOD PRESSURE: 103 MMHG | DIASTOLIC BLOOD PRESSURE: 63 MMHG

## 2019-01-30 VITALS — DIASTOLIC BLOOD PRESSURE: 60 MMHG | SYSTOLIC BLOOD PRESSURE: 96 MMHG

## 2019-01-30 VITALS — SYSTOLIC BLOOD PRESSURE: 98 MMHG | DIASTOLIC BLOOD PRESSURE: 61 MMHG

## 2019-01-30 VITALS — SYSTOLIC BLOOD PRESSURE: 94 MMHG | DIASTOLIC BLOOD PRESSURE: 63 MMHG

## 2019-01-30 VITALS — SYSTOLIC BLOOD PRESSURE: 89 MMHG | DIASTOLIC BLOOD PRESSURE: 78 MMHG

## 2019-01-30 VITALS — SYSTOLIC BLOOD PRESSURE: 92 MMHG | DIASTOLIC BLOOD PRESSURE: 63 MMHG

## 2019-01-30 VITALS — DIASTOLIC BLOOD PRESSURE: 55 MMHG | SYSTOLIC BLOOD PRESSURE: 77 MMHG

## 2019-01-30 VITALS — SYSTOLIC BLOOD PRESSURE: 98 MMHG | DIASTOLIC BLOOD PRESSURE: 63 MMHG

## 2019-01-30 VITALS — SYSTOLIC BLOOD PRESSURE: 91 MMHG | DIASTOLIC BLOOD PRESSURE: 62 MMHG

## 2019-01-30 VITALS — DIASTOLIC BLOOD PRESSURE: 59 MMHG | SYSTOLIC BLOOD PRESSURE: 96 MMHG

## 2019-01-30 VITALS — SYSTOLIC BLOOD PRESSURE: 85 MMHG | DIASTOLIC BLOOD PRESSURE: 55 MMHG

## 2019-01-30 VITALS — DIASTOLIC BLOOD PRESSURE: 60 MMHG | SYSTOLIC BLOOD PRESSURE: 87 MMHG

## 2019-01-30 VITALS — DIASTOLIC BLOOD PRESSURE: 62 MMHG | SYSTOLIC BLOOD PRESSURE: 105 MMHG

## 2019-01-30 LAB
ALBUMIN SERPL-MCNC: 2.7 G/DL (ref 3.4–5)
ALP SERPL-CCNC: 69 U/L (ref 46–116)
ALT SERPL-CCNC: 17 U/L (ref 10–68)
ANION GAP SERPL CALC-SCNC: 9.4 MMOL/L (ref 8–16)
BASOPHILS NFR BLD AUTO: 0 % (ref 0–2)
BILIRUB SERPL-MCNC: 1.35 MG/DL (ref 0.2–1.3)
BUN SERPL-MCNC: 43 MG/DL (ref 7–18)
CALCIUM SERPL-MCNC: 8.8 MG/DL (ref 8.5–10.1)
CHLORIDE SERPL-SCNC: 93 MMOL/L (ref 98–107)
CO2 SERPL-SCNC: 36.6 MMOL/L (ref 21–32)
CREAT SERPL-MCNC: 1.5 MG/DL (ref 0.6–1.3)
EOSINOPHIL NFR BLD: 0.3 % (ref 0–7)
ERYTHROCYTE [DISTWIDTH] IN BLOOD BY AUTOMATED COUNT: 13.5 % (ref 11.5–14.5)
GLOBULIN SER-MCNC: 4 G/L
GLUCOSE SERPL-MCNC: 123 MG/DL (ref 74–106)
HCT VFR BLD CALC: 42.9 % (ref 42–54)
HGB BLD-MCNC: 14 G/DL (ref 13.5–17.5)
IMM GRANULOCYTES NFR BLD: 0.3 % (ref 0–5)
LYMPHOCYTES NFR BLD AUTO: 5 % (ref 15–50)
MAGNESIUM SERPL-MCNC: 2.6 MG/DL (ref 1.8–2.4)
MCH RBC QN AUTO: 31.5 PG (ref 26–34)
MCHC RBC AUTO-ENTMCNC: 32.6 G/DL (ref 31–37)
MCV RBC: 96.4 FL (ref 80–100)
MONOCYTES NFR BLD: 8.1 % (ref 2–11)
NEUTROPHILS NFR BLD AUTO: 86.3 % (ref 40–80)
OSMOLALITY SERPL CALC.SUM OF ELEC: 281 MOSM/KG (ref 275–300)
PHOSPHATE SERPL-MCNC: 3 MG/DL (ref 2.5–4.9)
PLATELET # BLD: 166 10X3/UL (ref 130–400)
PMV BLD AUTO: 9.3 FL (ref 7.4–10.4)
POTASSIUM SERPL-SCNC: 4 MMOL/L (ref 3.5–5.1)
PROT SERPL-MCNC: 6.7 G/DL (ref 6.4–8.2)
RBC # BLD AUTO: 4.45 10X6/UL (ref 4.2–6.1)
SODIUM SERPL-SCNC: 135 MMOL/L (ref 136–145)
WBC # BLD AUTO: 11.6 10X3/UL (ref 4.8–10.8)

## 2019-01-31 VITALS — SYSTOLIC BLOOD PRESSURE: 103 MMHG | DIASTOLIC BLOOD PRESSURE: 59 MMHG

## 2019-01-31 VITALS — SYSTOLIC BLOOD PRESSURE: 101 MMHG | DIASTOLIC BLOOD PRESSURE: 75 MMHG

## 2019-01-31 VITALS — SYSTOLIC BLOOD PRESSURE: 108 MMHG | DIASTOLIC BLOOD PRESSURE: 67 MMHG

## 2019-01-31 VITALS — SYSTOLIC BLOOD PRESSURE: 104 MMHG | DIASTOLIC BLOOD PRESSURE: 64 MMHG

## 2019-01-31 VITALS — DIASTOLIC BLOOD PRESSURE: 64 MMHG | SYSTOLIC BLOOD PRESSURE: 105 MMHG

## 2019-01-31 VITALS — SYSTOLIC BLOOD PRESSURE: 99 MMHG | DIASTOLIC BLOOD PRESSURE: 66 MMHG

## 2019-01-31 VITALS — DIASTOLIC BLOOD PRESSURE: 78 MMHG | SYSTOLIC BLOOD PRESSURE: 113 MMHG

## 2019-01-31 VITALS — SYSTOLIC BLOOD PRESSURE: 97 MMHG | DIASTOLIC BLOOD PRESSURE: 65 MMHG

## 2019-01-31 VITALS — DIASTOLIC BLOOD PRESSURE: 85 MMHG | SYSTOLIC BLOOD PRESSURE: 98 MMHG

## 2019-01-31 VITALS — DIASTOLIC BLOOD PRESSURE: 64 MMHG | SYSTOLIC BLOOD PRESSURE: 97 MMHG

## 2019-01-31 VITALS — DIASTOLIC BLOOD PRESSURE: 67 MMHG | SYSTOLIC BLOOD PRESSURE: 120 MMHG

## 2019-01-31 VITALS — DIASTOLIC BLOOD PRESSURE: 61 MMHG | SYSTOLIC BLOOD PRESSURE: 103 MMHG

## 2019-01-31 LAB
ALBUMIN SERPL-MCNC: 2.6 G/DL (ref 3.4–5)
ALP SERPL-CCNC: 63 U/L (ref 46–116)
ALT SERPL-CCNC: 13 U/L (ref 10–68)
ANION GAP SERPL CALC-SCNC: 7.4 MMOL/L (ref 8–16)
BASOPHILS NFR BLD AUTO: 0 % (ref 0–2)
BILIRUB SERPL-MCNC: 1.21 MG/DL (ref 0.2–1.3)
BUN SERPL-MCNC: 37 MG/DL (ref 7–18)
CALCIUM SERPL-MCNC: 9.2 MG/DL (ref 8.5–10.1)
CHLORIDE SERPL-SCNC: 94 MMOL/L (ref 98–107)
CO2 SERPL-SCNC: 35.5 MMOL/L (ref 21–32)
CREAT SERPL-MCNC: 1.3 MG/DL (ref 0.6–1.3)
EOSINOPHIL NFR BLD: 3.1 % (ref 0–7)
ERYTHROCYTE [DISTWIDTH] IN BLOOD BY AUTOMATED COUNT: 13.9 % (ref 11.5–14.5)
GLOBULIN SER-MCNC: 3.8 G/L
GLUCOSE SERPL-MCNC: 81 MG/DL (ref 74–106)
HCT VFR BLD CALC: 41.4 % (ref 42–54)
HGB BLD-MCNC: 13.9 G/DL (ref 13.5–17.5)
IMM GRANULOCYTES NFR BLD: 0.5 % (ref 0–5)
LYMPHOCYTES NFR BLD AUTO: 9.8 % (ref 15–50)
MAGNESIUM SERPL-MCNC: 2.7 MG/DL (ref 1.8–2.4)
MCH RBC QN AUTO: 32 PG (ref 26–34)
MCHC RBC AUTO-ENTMCNC: 33.6 G/DL (ref 31–37)
MCV RBC: 95.2 FL (ref 80–100)
MONOCYTES NFR BLD: 12 % (ref 2–11)
NEUTROPHILS NFR BLD AUTO: 74.6 % (ref 40–80)
NT-PROBNP SERPL-MCNC: 2002 PG/ML (ref 0–450)
OSMOLALITY SERPL CALC.SUM OF ELEC: 273 MOSM/KG (ref 275–300)
PHOSPHATE SERPL-MCNC: 2.4 MG/DL (ref 2.5–4.9)
PLATELET # BLD: 166 10X3/UL (ref 130–400)
PMV BLD AUTO: 10.1 FL (ref 7.4–10.4)
POTASSIUM SERPL-SCNC: 3.9 MMOL/L (ref 3.5–5.1)
PROT SERPL-MCNC: 6.4 G/DL (ref 6.4–8.2)
RBC # BLD AUTO: 4.35 10X6/UL (ref 4.2–6.1)
SODIUM SERPL-SCNC: 133 MMOL/L (ref 136–145)
WBC # BLD AUTO: 10 10X3/UL (ref 4.8–10.8)

## 2019-02-01 VITALS — DIASTOLIC BLOOD PRESSURE: 58 MMHG | SYSTOLIC BLOOD PRESSURE: 95 MMHG

## 2019-02-01 VITALS — SYSTOLIC BLOOD PRESSURE: 99 MMHG | DIASTOLIC BLOOD PRESSURE: 61 MMHG

## 2019-02-01 VITALS — DIASTOLIC BLOOD PRESSURE: 55 MMHG | SYSTOLIC BLOOD PRESSURE: 83 MMHG

## 2019-02-01 VITALS — SYSTOLIC BLOOD PRESSURE: 98 MMHG | DIASTOLIC BLOOD PRESSURE: 58 MMHG

## 2019-02-01 VITALS — DIASTOLIC BLOOD PRESSURE: 62 MMHG | SYSTOLIC BLOOD PRESSURE: 96 MMHG

## 2019-02-01 LAB
ALBUMIN SERPL-MCNC: 2.8 G/DL (ref 3.4–5)
ALP SERPL-CCNC: 67 U/L (ref 46–116)
ALT SERPL-CCNC: 15 U/L (ref 10–68)
ANION GAP SERPL CALC-SCNC: 12.6 MMOL/L (ref 8–16)
BASOPHILS NFR BLD AUTO: 0.1 % (ref 0–2)
BILIRUB SERPL-MCNC: 1.52 MG/DL (ref 0.2–1.3)
BUN SERPL-MCNC: 34 MG/DL (ref 7–18)
CALCIUM SERPL-MCNC: 8.9 MG/DL (ref 8.5–10.1)
CHLORIDE SERPL-SCNC: 93 MMOL/L (ref 98–107)
CO2 SERPL-SCNC: 29.6 MMOL/L (ref 21–32)
CREAT SERPL-MCNC: 1.2 MG/DL (ref 0.6–1.3)
EOSINOPHIL NFR BLD: 1.5 % (ref 0–7)
ERYTHROCYTE [DISTWIDTH] IN BLOOD BY AUTOMATED COUNT: 13.9 % (ref 11.5–14.5)
GLOBULIN SER-MCNC: 3.9 G/L
GLUCOSE SERPL-MCNC: 78 MG/DL (ref 74–106)
HCT VFR BLD CALC: 42.6 % (ref 42–54)
HGB BLD-MCNC: 14.7 G/DL (ref 13.5–17.5)
IMM GRANULOCYTES NFR BLD: 0.6 % (ref 0–5)
LYMPHOCYTES NFR BLD AUTO: 15.6 % (ref 15–50)
MAGNESIUM SERPL-MCNC: 2.7 MG/DL (ref 1.8–2.4)
MCH RBC QN AUTO: 32.1 PG (ref 26–34)
MCHC RBC AUTO-ENTMCNC: 34.5 G/DL (ref 31–37)
MCV RBC: 93 FL (ref 80–100)
MONOCYTES NFR BLD: 12 % (ref 2–11)
NEUTROPHILS NFR BLD AUTO: 70.2 % (ref 40–80)
OSMOLALITY SERPL CALC.SUM OF ELEC: 269 MOSM/KG (ref 275–300)
PHOSPHATE SERPL-MCNC: 2.9 MG/DL (ref 2.5–4.9)
PLATELET # BLD: 177 10X3/UL (ref 130–400)
PMV BLD AUTO: 10 FL (ref 7.4–10.4)
POTASSIUM SERPL-SCNC: 4.2 MMOL/L (ref 3.5–5.1)
PROT SERPL-MCNC: 6.7 G/DL (ref 6.4–8.2)
RBC # BLD AUTO: 4.58 10X6/UL (ref 4.2–6.1)
SODIUM SERPL-SCNC: 131 MMOL/L (ref 136–145)
WBC # BLD AUTO: 10.8 10X3/UL (ref 4.8–10.8)

## 2019-02-02 VITALS — DIASTOLIC BLOOD PRESSURE: 56 MMHG | SYSTOLIC BLOOD PRESSURE: 98 MMHG

## 2019-02-02 VITALS — SYSTOLIC BLOOD PRESSURE: 96 MMHG | DIASTOLIC BLOOD PRESSURE: 58 MMHG

## 2019-02-02 VITALS — SYSTOLIC BLOOD PRESSURE: 86 MMHG | DIASTOLIC BLOOD PRESSURE: 52 MMHG

## 2019-02-02 VITALS — SYSTOLIC BLOOD PRESSURE: 80 MMHG | DIASTOLIC BLOOD PRESSURE: 54 MMHG

## 2019-02-02 VITALS — SYSTOLIC BLOOD PRESSURE: 129 MMHG | DIASTOLIC BLOOD PRESSURE: 57 MMHG

## 2019-02-03 VITALS — SYSTOLIC BLOOD PRESSURE: 119 MMHG | DIASTOLIC BLOOD PRESSURE: 48 MMHG

## 2019-02-03 VITALS — DIASTOLIC BLOOD PRESSURE: 49 MMHG | SYSTOLIC BLOOD PRESSURE: 105 MMHG

## 2019-02-03 VITALS — DIASTOLIC BLOOD PRESSURE: 65 MMHG | SYSTOLIC BLOOD PRESSURE: 127 MMHG

## 2019-02-03 VITALS — DIASTOLIC BLOOD PRESSURE: 60 MMHG | SYSTOLIC BLOOD PRESSURE: 126 MMHG

## 2019-02-03 LAB
ANION GAP SERPL CALC-SCNC: 10 MMOL/L (ref 8–16)
BASOPHILS NFR BLD AUTO: 0.1 % (ref 0–2)
BUN SERPL-MCNC: 26 MG/DL (ref 7–18)
CALCIUM SERPL-MCNC: 8.7 MG/DL (ref 8.5–10.1)
CHLORIDE SERPL-SCNC: 96 MMOL/L (ref 98–107)
CO2 SERPL-SCNC: 30.9 MMOL/L (ref 21–32)
CREAT SERPL-MCNC: 1 MG/DL (ref 0.6–1.3)
EOSINOPHIL NFR BLD: 1.9 % (ref 0–7)
ERYTHROCYTE [DISTWIDTH] IN BLOOD BY AUTOMATED COUNT: 13.9 % (ref 11.5–14.5)
GLUCOSE SERPL-MCNC: 93 MG/DL (ref 74–106)
HCT VFR BLD CALC: 42.1 % (ref 42–54)
HGB BLD-MCNC: 14.1 G/DL (ref 13.5–17.5)
IMM GRANULOCYTES NFR BLD: 0.7 % (ref 0–5)
LYMPHOCYTES NFR BLD AUTO: 11 % (ref 15–50)
MCH RBC QN AUTO: 31.2 PG (ref 26–34)
MCHC RBC AUTO-ENTMCNC: 33.5 G/DL (ref 31–37)
MCV RBC: 93.1 FL (ref 80–100)
MONOCYTES NFR BLD: 12.5 % (ref 2–11)
NEUTROPHILS NFR BLD AUTO: 73.8 % (ref 40–80)
OSMOLALITY SERPL CALC.SUM OF ELEC: 270 MOSM/KG (ref 275–300)
PLATELET # BLD: 137 10X3/UL (ref 130–400)
PMV BLD AUTO: 9.7 FL (ref 7.4–10.4)
POTASSIUM SERPL-SCNC: 3.9 MMOL/L (ref 3.5–5.1)
RBC # BLD AUTO: 4.52 10X6/UL (ref 4.2–6.1)
SODIUM SERPL-SCNC: 133 MMOL/L (ref 136–145)
WBC # BLD AUTO: 12.2 10X3/UL (ref 4.8–10.8)

## 2019-02-04 VITALS — DIASTOLIC BLOOD PRESSURE: 59 MMHG | SYSTOLIC BLOOD PRESSURE: 96 MMHG

## 2019-02-04 VITALS — DIASTOLIC BLOOD PRESSURE: 56 MMHG | SYSTOLIC BLOOD PRESSURE: 93 MMHG

## 2019-02-04 VITALS — SYSTOLIC BLOOD PRESSURE: 92 MMHG | DIASTOLIC BLOOD PRESSURE: 58 MMHG

## 2019-02-04 VITALS — SYSTOLIC BLOOD PRESSURE: 100 MMHG | DIASTOLIC BLOOD PRESSURE: 62 MMHG

## 2019-02-04 LAB
ANION GAP SERPL CALC-SCNC: 8.1 MMOL/L (ref 8–16)
BASOPHILS NFR BLD AUTO: 0.1 % (ref 0–2)
BUN SERPL-MCNC: 23 MG/DL (ref 7–18)
CALCIUM SERPL-MCNC: 8.2 MG/DL (ref 8.5–10.1)
CHLORIDE SERPL-SCNC: 97 MMOL/L (ref 98–107)
CO2 SERPL-SCNC: 29.9 MMOL/L (ref 21–32)
CREAT SERPL-MCNC: 0.9 MG/DL (ref 0.6–1.3)
EOSINOPHIL NFR BLD: 1.9 % (ref 0–7)
ERYTHROCYTE [DISTWIDTH] IN BLOOD BY AUTOMATED COUNT: 13.9 % (ref 11.5–14.5)
GLUCOSE SERPL-MCNC: 93 MG/DL (ref 74–106)
HCT VFR BLD CALC: 39.4 % (ref 42–54)
HGB BLD-MCNC: 13.5 G/DL (ref 13.5–17.5)
IMM GRANULOCYTES NFR BLD: 0.4 % (ref 0–5)
LYMPHOCYTES NFR BLD AUTO: 11.1 % (ref 15–50)
MCH RBC QN AUTO: 31.8 PG (ref 26–34)
MCHC RBC AUTO-ENTMCNC: 34.3 G/DL (ref 31–37)
MCV RBC: 92.9 FL (ref 80–100)
MONOCYTES NFR BLD: 12.7 % (ref 2–11)
NEUTROPHILS NFR BLD AUTO: 73.8 % (ref 40–80)
OSMOLALITY SERPL CALC.SUM OF ELEC: 266 MOSM/KG (ref 275–300)
PLATELET # BLD: 132 10X3/UL (ref 130–400)
PMV BLD AUTO: 9.5 FL (ref 7.4–10.4)
POTASSIUM SERPL-SCNC: 4 MMOL/L (ref 3.5–5.1)
RBC # BLD AUTO: 4.24 10X6/UL (ref 4.2–6.1)
SODIUM SERPL-SCNC: 131 MMOL/L (ref 136–145)
WBC # BLD AUTO: 11.5 10X3/UL (ref 4.8–10.8)

## 2019-02-05 ENCOUNTER — HOSPITAL ENCOUNTER (INPATIENT)
Dept: HOSPITAL 84 - D.REHAB | Age: 84
LOS: 16 days | Discharge: HOME HEALTH | DRG: 56 | End: 2019-02-21
Attending: FAMILY MEDICINE | Admitting: FAMILY MEDICINE
Payer: MEDICARE

## 2019-02-05 VITALS — SYSTOLIC BLOOD PRESSURE: 99 MMHG | DIASTOLIC BLOOD PRESSURE: 59 MMHG

## 2019-02-05 VITALS — SYSTOLIC BLOOD PRESSURE: 94 MMHG | DIASTOLIC BLOOD PRESSURE: 59 MMHG

## 2019-02-05 VITALS — HEIGHT: 70 IN | BODY MASS INDEX: 32.35 KG/M2 | BODY MASS INDEX: 32.35 KG/M2 | WEIGHT: 226 LBS

## 2019-02-05 VITALS — DIASTOLIC BLOOD PRESSURE: 59 MMHG | SYSTOLIC BLOOD PRESSURE: 94 MMHG

## 2019-02-05 VITALS — DIASTOLIC BLOOD PRESSURE: 58 MMHG | SYSTOLIC BLOOD PRESSURE: 97 MMHG

## 2019-02-05 VITALS — DIASTOLIC BLOOD PRESSURE: 61 MMHG | SYSTOLIC BLOOD PRESSURE: 95 MMHG

## 2019-02-05 VITALS — SYSTOLIC BLOOD PRESSURE: 101 MMHG | DIASTOLIC BLOOD PRESSURE: 66 MMHG

## 2019-02-05 DIAGNOSIS — J96.21: ICD-10-CM

## 2019-02-05 DIAGNOSIS — N18.9: ICD-10-CM

## 2019-02-05 DIAGNOSIS — I69.30: Primary | ICD-10-CM

## 2019-02-05 DIAGNOSIS — J44.9: ICD-10-CM

## 2019-02-05 DIAGNOSIS — I27.20: ICD-10-CM

## 2019-02-05 DIAGNOSIS — D64.9: ICD-10-CM

## 2019-02-05 DIAGNOSIS — I62.00: ICD-10-CM

## 2019-02-05 DIAGNOSIS — G81.94: ICD-10-CM

## 2019-02-05 DIAGNOSIS — G47.33: ICD-10-CM

## 2019-02-05 DIAGNOSIS — J90: ICD-10-CM

## 2019-02-05 DIAGNOSIS — I48.91: ICD-10-CM

## 2019-02-05 DIAGNOSIS — E78.5: ICD-10-CM

## 2019-02-05 DIAGNOSIS — I12.9: ICD-10-CM

## 2019-02-05 DIAGNOSIS — R41.82: ICD-10-CM

## 2019-02-05 DIAGNOSIS — I71.2: ICD-10-CM

## 2019-02-05 DIAGNOSIS — I25.10: ICD-10-CM

## 2019-02-05 DIAGNOSIS — M19.90: ICD-10-CM

## 2019-02-05 DIAGNOSIS — R53.81: ICD-10-CM

## 2019-02-05 LAB
ANION GAP SERPL CALC-SCNC: 10.7 MMOL/L (ref 8–16)
BASOPHILS NFR BLD AUTO: 0.1 % (ref 0–2)
BUN SERPL-MCNC: 20 MG/DL (ref 7–18)
CALCIUM SERPL-MCNC: 8.4 MG/DL (ref 8.5–10.1)
CHLORIDE SERPL-SCNC: 98 MMOL/L (ref 98–107)
CO2 SERPL-SCNC: 30.1 MMOL/L (ref 21–32)
CREAT SERPL-MCNC: 0.8 MG/DL (ref 0.6–1.3)
EOSINOPHIL NFR BLD: 2.4 % (ref 0–7)
ERYTHROCYTE [DISTWIDTH] IN BLOOD BY AUTOMATED COUNT: 13.9 % (ref 11.5–14.5)
GLUCOSE SERPL-MCNC: 81 MG/DL (ref 74–106)
HCT VFR BLD CALC: 38.7 % (ref 42–54)
HGB BLD-MCNC: 13.1 G/DL (ref 13.5–17.5)
IMM GRANULOCYTES NFR BLD: 0.6 % (ref 0–5)
LYMPHOCYTES NFR BLD AUTO: 18.4 % (ref 15–50)
MCH RBC QN AUTO: 31.6 PG (ref 26–34)
MCHC RBC AUTO-ENTMCNC: 33.9 G/DL (ref 31–37)
MCV RBC: 93.5 FL (ref 80–100)
MONOCYTES NFR BLD: 11.6 % (ref 2–11)
NEUTROPHILS NFR BLD AUTO: 66.9 % (ref 40–80)
OSMOLALITY SERPL CALC.SUM OF ELEC: 271 MOSM/KG (ref 275–300)
PLATELET # BLD: 138 10X3/UL (ref 130–400)
PMV BLD AUTO: 9.3 FL (ref 7.4–10.4)
POTASSIUM SERPL-SCNC: 3.8 MMOL/L (ref 3.5–5.1)
RBC # BLD AUTO: 4.14 10X6/UL (ref 4.2–6.1)
SODIUM SERPL-SCNC: 135 MMOL/L (ref 136–145)
WBC # BLD AUTO: 9.4 10X3/UL (ref 4.8–10.8)

## 2019-02-05 NOTE — MORECARE
CASE MANAGEMENT DISCHARGE SUMMARY
 
 
PATIENT: LINA HUTCHISON                    UNIT: [id number]
ACCOUNT#: [id number]                       ADM DATE: 19
AGE: 84     : 37  SEX: M            ROOM/BED: D.1212    
AUTHOR: CONNIE LOCK                             PHYSICIAN:                               
 
REFERRING PHYSICIAN: BIBI POLLARD DO            
DATE OF SERVICE: 19
Discharge Plan
 
 
Patient Name: LINA HUTCHISON
Facility: [facility]:[place]
Encounter #: [id number]
Medical Record #: [id number]
: 1935
Planned Disposition: Inpatient Rehab
Anticipated Discharge Date: 
 
Discharge Date: 
Expected LOS: 
Initial Reviewer: [id number]
Initial Review Date: 2019
Generated: 19   2:03 pm 
Comments
 
DCP- Discharge Planning
 
Updated by [email or web address]: Avelina Kaminski on 19   9:56 pm CT
Patient Name: LINA HUTCHISON                                     
Admission Status: Urgent   
Accout number: [id number]                              
Admission Date: 2019   
: 1935                                                        
Admission Diagnosis:   
Attending: BIBI POLLARD                                                
Current LOS:  1   
  
Anticipated DC Date:    
Planned Disposition:    
Primary Insurance: UNINSURED DISCOUNT PLAN   
  
  
Discharge Planning Comments: CM met with patient and spouse at bedside after 
obtaining verbal consent.  Patient states he plans on returning home after 
discharge with his wife. Patient states he does have [facility] and 
plans on resuming care when discharged.  Patient states he will have family 
transport him home via private vehicle.  Patient denies any discharge needs 
 
at this time. CM will continue to follow and assist as needed for discharge 
planning / needs.  
  
  
  
  
  
  
: Avelina Kaminski
 DCPIA - Discharge Planning Initial Assessment
 
Updated by [id number]: Avelina Kaminski on 19  10:54 pm
*  Is the patient Alert and Oriented?
Yes
*  How many steps to enter\exit or inside your home? ramp *  PCP DR[facility] - [place]
[facility]
*  [facility] [place]
 
*  Preadmission Environment
Home with Family
*  ADLs
Independent
*  Other Equipment
W/C, WALKER, HOSPITAL BED, 02, WALK-IN TUB, GRAB BARS
*  List name and contact numbers for known caregivers / representatives who 
currently or will assist patient after discharge:
RICHARD HUTCHISON - WIFE- [phone]
*  Verbal permission to speak to the caregivers and representatives has been 
obtained from the patient.
Yes
*  Community resources currently utilized
Home Health
*  Please name any agencies selected above.
[facility] [place]
*  Additional services required to return to the preadmission environment?
No
*  Can the patient safely return to the preadmission environment?
Yes
*  Has this patient been hospitalized within the prior 30 days at any 
hospital?
No
 
 
 
 
 
 
 
Last DP export: 19   9:59 p
Patient Name: LINA HUTCHISON
 
Encounter #: [id number]
Page [id number]
 
 
 
 
 
Electronically Signed by CONNIE LOCK on 19 at 1303
 
 
 
 
 
 
**All edits/amendments must be made on the electronic document**
 
DICTATION DATE: 19 1302     : SAVI  19 1302     
RPT#: [id number]                                DC DATE:        
                                               STATUS: ADM IN  
[facility]
 [place] [address]
***END OF REPORT***

## 2019-02-06 VITALS — SYSTOLIC BLOOD PRESSURE: 101 MMHG | DIASTOLIC BLOOD PRESSURE: 58 MMHG

## 2019-02-06 VITALS — DIASTOLIC BLOOD PRESSURE: 57 MMHG | SYSTOLIC BLOOD PRESSURE: 91 MMHG

## 2019-02-06 LAB
ANION GAP SERPL CALC-SCNC: 10.1 MMOL/L (ref 8–16)
APPEARANCE UR: CLEAR
BACTERIA #/AREA URNS HPF: (no result) /HPF
BASOPHILS NFR BLD AUTO: 0.3 % (ref 0–2)
BILIRUB SERPL-MCNC: NEGATIVE MG/DL
BUN SERPL-MCNC: 17 MG/DL (ref 7–18)
CALCIUM SERPL-MCNC: 8.3 MG/DL (ref 8.5–10.1)
CHLORIDE SERPL-SCNC: 100 MMOL/L (ref 98–107)
CO2 SERPL-SCNC: 30.9 MMOL/L (ref 21–32)
COLOR UR: YELLOW
CREAT SERPL-MCNC: 0.6 MG/DL (ref 0.6–1.3)
EOSINOPHIL NFR BLD: 4.3 % (ref 0–7)
ERYTHROCYTE [DISTWIDTH] IN BLOOD BY AUTOMATED COUNT: 13.7 % (ref 11.5–14.5)
GLUCOSE SERPL-MCNC: 78 MG/DL (ref 74–106)
GLUCOSE SERPL-MCNC: NEGATIVE MG/DL
HCT VFR BLD CALC: 36.8 % (ref 42–54)
HGB BLD-MCNC: 12.4 G/DL (ref 13.5–17.5)
IMM GRANULOCYTES NFR BLD: 0.4 % (ref 0–5)
KETONES UR STRIP-MCNC: NEGATIVE MG/DL
LYMPHOCYTES NFR BLD AUTO: 13.3 % (ref 15–50)
MCH RBC QN AUTO: 31.3 PG (ref 26–34)
MCHC RBC AUTO-ENTMCNC: 33.7 G/DL (ref 31–37)
MCV RBC: 92.9 FL (ref 80–100)
MONOCYTES NFR BLD: 9.8 % (ref 2–11)
NEUTROPHILS NFR BLD AUTO: 71.9 % (ref 40–80)
NITRITE UR-MCNC: NEGATIVE MG/ML
OSMOLALITY SERPL CALC.SUM OF ELEC: 274 MOSM/KG (ref 275–300)
PH UR STRIP: 5 [PH] (ref 5–6)
PLATELET # BLD: 145 10X3/UL (ref 130–400)
PMV BLD AUTO: 9.3 FL (ref 7.4–10.4)
POTASSIUM SERPL-SCNC: 4 MMOL/L (ref 3.5–5.1)
PROT UR-MCNC: NEGATIVE MG/DL
RBC # BLD AUTO: 3.96 10X6/UL (ref 4.2–6.1)
RBC #/AREA URNS HPF: (no result) /HPF (ref 0–5)
SODIUM SERPL-SCNC: 137 MMOL/L (ref 136–145)
SP GR UR STRIP: 1.01 (ref 1–1.02)
SQUAMOUS #/AREA URNS HPF: (no result) /HPF (ref 0–5)
UROBILINOGEN UR-MCNC: NORMAL MG/DL
WBC # BLD AUTO: 7.1 10X3/UL (ref 4.8–10.8)
WBC #/AREA URNS HPF: (no result) /HPF (ref 0–5)

## 2019-02-06 NOTE — MORECARE
CASE MANAGEMENT DISCHARGE SUMMARY
 
 
PATIENT: LINA HUTCHISON                    UNIT: [id number]
ACCOUNT#: [id number]                       ADM DATE: 19
AGE: 84     : 37  SEX: M            ROOM/BED: D.1212    
AUTHOR: CONNIE LOCK                             PHYSICIAN:                               
 
REFERRING PHYSICIAN: BIBI POLLARD DO            
DATE OF SERVICE: 19
Discharge Plan
 
 
Patient Name: LINA HUTCHISON
Facility: [facility]:[place]
Encounter #: [id number]
Medical Record #: [id number]
: 1935
Planned Disposition: Inpatient Rehab
Anticipated Discharge Date: 19
 
Discharge Date: 2019
Expected LOS: 13
Initial Reviewer: [email or web address]
Initial Review Date: 2019
Generated: 19   9:16 am 
Comments
 
DCP- Discharge Planning
 
Updated by [id number]: Avelina Kaminski on 19   9:56 pm CT
Patient Name: LINA HUTCHISON                                     
Admission Status: Urgent   
Accout number: [id number]                              
Admission Date: 2019   
: 1935                                                        
Admission Diagnosis:   
Attending: BIBI POLLARD                                                
Current LOS:  1   
  
Anticipated DC Date:    
Planned Disposition:    
Primary Insurance: UNINSURED DISCOUNT PLAN   
  
  
Discharge Planning Comments: CM met with patient and spouse at bedside after 
obtaining verbal consent.  Patient states he plans on returning home after 
discharge with his wife. Patient states he does have [facility] and 
plans on resuming care when discharged.  Patient states he will have family 
transport him home via private vehicle.  Patient denies any discharge needs 
 
at this time. CM will continue to follow and assist as needed for discharge 
planning / needs.  
  
  
  
  
  
  
: Avelina Kaminski
 DCPIA - Discharge Planning Initial Assessment
 
Updated by [id number]: Avelina Kaminski on 19  10:54 pm
*  Is the patient Alert and Oriented?
Yes
*  How many steps to enter\exit or inside your home? ramp *  PCP DR. VANG - [place]
[facility]
*  [facility] [place]
 
*  Preadmission Environment
Home with Family
*  ADLs
Independent
*  Other Equipment
W/C, WALKER, HOSPITAL BED, 02, WALK-IN TUB, GRAB BARS
*  List name and contact numbers for known caregivers / representatives who 
currently or will assist patient after discharge:
RICHARD HUTCHISON - WIFE- [phone]
*  Verbal permission to speak to the caregivers and representatives has been 
obtained from the patient.
Yes
*  Community resources currently utilized
Home Health
*  Please name any agencies selected above.
[facility] [place]
*  Additional services required to return to the preadmission environment?
No
*  Can the patient safely return to the preadmission environment?
Yes
*  Has this patient been hospitalized within the prior 30 days at any 
hospital?
No
 
 
 
 
 
Coverage Notice
 
Reviewer: [email or web address] Hitesh Magallanes
 
Notice Issued Date-Time: 2019  12:58
 
Notice Type: IM Discharge Notice
 
Notice Delivered To: Family Member
Relationship to Patient: Son
Representative Name: 
 
Delivery Method: HAND - Hand Delivered
Elizabeth Days:
Prior Verbal Notification: 
 
Recipient Understood Notice: Yes
Recipient Signature: Yes
Med Rec Note Co-signed by Attending:
 
Coverage Notice Comment:  
 
Last DP export: 19  12:03 pm
Patient Name: LINA HUTCHISON
Encounter #: [id number]
Page [id number]
 
 
 
 
 
Electronically Signed by CONNIE LOCK on 19 at 0816
 
 
 
 
 
 
**All edits/amendments must be made on the electronic document**
 
DICTATION DATE: 19     : SAVI  1916     
RPT#: [id number]                                DC DATE:19
                                               STATUS: DIS IN  
[facility]
[address] [place] [address]
***END OF REPORT***

## 2019-02-07 VITALS — SYSTOLIC BLOOD PRESSURE: 110 MMHG | DIASTOLIC BLOOD PRESSURE: 71 MMHG

## 2019-02-07 VITALS — SYSTOLIC BLOOD PRESSURE: 103 MMHG | DIASTOLIC BLOOD PRESSURE: 57 MMHG

## 2019-02-08 VITALS — DIASTOLIC BLOOD PRESSURE: 53 MMHG | SYSTOLIC BLOOD PRESSURE: 90 MMHG

## 2019-02-08 VITALS — DIASTOLIC BLOOD PRESSURE: 64 MMHG | SYSTOLIC BLOOD PRESSURE: 98 MMHG

## 2019-02-08 LAB
ANION GAP SERPL CALC-SCNC: 10.3 MMOL/L (ref 8–16)
BASOPHILS NFR BLD AUTO: 0.3 % (ref 0–2)
BUN SERPL-MCNC: 14 MG/DL (ref 7–18)
CALCIUM SERPL-MCNC: 8.6 MG/DL (ref 8.5–10.1)
CHLORIDE SERPL-SCNC: 99 MMOL/L (ref 98–107)
CO2 SERPL-SCNC: 31.8 MMOL/L (ref 21–32)
CREAT SERPL-MCNC: 0.8 MG/DL (ref 0.6–1.3)
EOSINOPHIL NFR BLD: 3.5 % (ref 0–7)
ERYTHROCYTE [DISTWIDTH] IN BLOOD BY AUTOMATED COUNT: 13.7 % (ref 11.5–14.5)
GLUCOSE SERPL-MCNC: 96 MG/DL (ref 74–106)
HCT VFR BLD CALC: 37 % (ref 42–54)
HGB BLD-MCNC: 12.3 G/DL (ref 13.5–17.5)
IMM GRANULOCYTES NFR BLD: 0.3 % (ref 0–5)
LYMPHOCYTES NFR BLD AUTO: 15.9 % (ref 15–50)
MCH RBC QN AUTO: 31.3 PG (ref 26–34)
MCHC RBC AUTO-ENTMCNC: 33.2 G/DL (ref 31–37)
MCV RBC: 94.1 FL (ref 80–100)
MONOCYTES NFR BLD: 9.2 % (ref 2–11)
NEUTROPHILS NFR BLD AUTO: 70.8 % (ref 40–80)
OSMOLALITY SERPL CALC.SUM OF ELEC: 274 MOSM/KG (ref 275–300)
PLATELET # BLD: 137 10X3/UL (ref 130–400)
PMV BLD AUTO: 9.1 FL (ref 7.4–10.4)
POTASSIUM SERPL-SCNC: 4.1 MMOL/L (ref 3.5–5.1)
RBC # BLD AUTO: 3.93 10X6/UL (ref 4.2–6.1)
SODIUM SERPL-SCNC: 137 MMOL/L (ref 136–145)
WBC # BLD AUTO: 5.8 10X3/UL (ref 4.8–10.8)

## 2019-02-09 VITALS — DIASTOLIC BLOOD PRESSURE: 57 MMHG | SYSTOLIC BLOOD PRESSURE: 129 MMHG

## 2019-02-09 VITALS — SYSTOLIC BLOOD PRESSURE: 110 MMHG | DIASTOLIC BLOOD PRESSURE: 68 MMHG

## 2019-02-10 VITALS — DIASTOLIC BLOOD PRESSURE: 62 MMHG | SYSTOLIC BLOOD PRESSURE: 102 MMHG

## 2019-02-10 VITALS — DIASTOLIC BLOOD PRESSURE: 78 MMHG | SYSTOLIC BLOOD PRESSURE: 118 MMHG

## 2019-02-11 VITALS — DIASTOLIC BLOOD PRESSURE: 61 MMHG | SYSTOLIC BLOOD PRESSURE: 103 MMHG

## 2019-02-11 LAB
ANION GAP SERPL CALC-SCNC: 8.2 MMOL/L (ref 8–16)
BASOPHILS NFR BLD AUTO: 0.2 % (ref 0–2)
BUN SERPL-MCNC: 12 MG/DL (ref 7–18)
CALCIUM SERPL-MCNC: 8.6 MG/DL (ref 8.5–10.1)
CHLORIDE SERPL-SCNC: 100 MMOL/L (ref 98–107)
CO2 SERPL-SCNC: 34 MMOL/L (ref 21–32)
CREAT SERPL-MCNC: 0.7 MG/DL (ref 0.6–1.3)
EOSINOPHIL NFR BLD: 4.8 % (ref 0–7)
ERYTHROCYTE [DISTWIDTH] IN BLOOD BY AUTOMATED COUNT: 13.7 % (ref 11.5–14.5)
GLUCOSE SERPL-MCNC: 86 MG/DL (ref 74–106)
HCT VFR BLD CALC: 36.2 % (ref 42–54)
HGB BLD-MCNC: 11.9 G/DL (ref 13.5–17.5)
IMM GRANULOCYTES NFR BLD: 0.2 % (ref 0–5)
LYMPHOCYTES NFR BLD AUTO: 19.3 % (ref 15–50)
MCH RBC QN AUTO: 30.9 PG (ref 26–34)
MCHC RBC AUTO-ENTMCNC: 32.9 G/DL (ref 31–37)
MCV RBC: 94 FL (ref 80–100)
MONOCYTES NFR BLD: 8.6 % (ref 2–11)
NEUTROPHILS NFR BLD AUTO: 66.9 % (ref 40–80)
OSMOLALITY SERPL CALC.SUM OF ELEC: 274 MOSM/KG (ref 275–300)
PLATELET # BLD: 141 10X3/UL (ref 130–400)
PMV BLD AUTO: 9 FL (ref 7.4–10.4)
POTASSIUM SERPL-SCNC: 4.2 MMOL/L (ref 3.5–5.1)
RBC # BLD AUTO: 3.85 10X6/UL (ref 4.2–6.1)
SODIUM SERPL-SCNC: 138 MMOL/L (ref 136–145)
WBC # BLD AUTO: 5.5 10X3/UL (ref 4.8–10.8)

## 2019-02-12 VITALS — SYSTOLIC BLOOD PRESSURE: 98 MMHG | DIASTOLIC BLOOD PRESSURE: 61 MMHG

## 2019-02-12 VITALS — DIASTOLIC BLOOD PRESSURE: 64 MMHG | SYSTOLIC BLOOD PRESSURE: 112 MMHG

## 2019-02-13 VITALS — DIASTOLIC BLOOD PRESSURE: 62 MMHG | SYSTOLIC BLOOD PRESSURE: 101 MMHG

## 2019-02-13 VITALS — SYSTOLIC BLOOD PRESSURE: 100 MMHG | DIASTOLIC BLOOD PRESSURE: 68 MMHG

## 2019-02-13 LAB
ANION GAP SERPL CALC-SCNC: 9.3 MMOL/L (ref 8–16)
BASOPHILS NFR BLD AUTO: 0.2 % (ref 0–2)
BUN SERPL-MCNC: 13 MG/DL (ref 7–18)
CALCIUM SERPL-MCNC: 8.9 MG/DL (ref 8.5–10.1)
CHLORIDE SERPL-SCNC: 100 MMOL/L (ref 98–107)
CO2 SERPL-SCNC: 34.3 MMOL/L (ref 21–32)
CREAT SERPL-MCNC: 0.8 MG/DL (ref 0.6–1.3)
EOSINOPHIL NFR BLD: 5.4 % (ref 0–7)
ERYTHROCYTE [DISTWIDTH] IN BLOOD BY AUTOMATED COUNT: 13.7 % (ref 11.5–14.5)
GLUCOSE SERPL-MCNC: 80 MG/DL (ref 74–106)
HCT VFR BLD CALC: 36.9 % (ref 42–54)
HGB BLD-MCNC: 12.2 G/DL (ref 13.5–17.5)
IMM GRANULOCYTES NFR BLD: 0.2 % (ref 0–5)
LYMPHOCYTES NFR BLD AUTO: 19.5 % (ref 15–50)
MCH RBC QN AUTO: 31.3 PG (ref 26–34)
MCHC RBC AUTO-ENTMCNC: 33.1 G/DL (ref 31–37)
MCV RBC: 94.6 FL (ref 80–100)
MONOCYTES NFR BLD: 8.9 % (ref 2–11)
NEUTROPHILS NFR BLD AUTO: 65.8 % (ref 40–80)
OSMOLALITY SERPL CALC.SUM OF ELEC: 276 MOSM/KG (ref 275–300)
PLATELET # BLD: 154 10X3/UL (ref 130–400)
PMV BLD AUTO: 9.3 FL (ref 7.4–10.4)
POTASSIUM SERPL-SCNC: 4.6 MMOL/L (ref 3.5–5.1)
RBC # BLD AUTO: 3.9 10X6/UL (ref 4.2–6.1)
SODIUM SERPL-SCNC: 139 MMOL/L (ref 136–145)
WBC # BLD AUTO: 5.2 10X3/UL (ref 4.8–10.8)

## 2019-02-13 NOTE — RHP
PATIENT: LINA HUTCHISON                                MEDICAL RECORD: [id number]
ACCOUNT: [id number]                                    LOCATION:[facility]
: 37                                            ADMISSION DATE: 19
                                                         
 
                     REHABILITATION HISTORY AND PHYSICAL EXAMINATION
                         POST ADMISSION PHYSICIAN EXAMINATION
 
 
DATE OF ADMISSION:  2019.
 
ADMITTING DIAGNOSIS:  Subdural hematoma at interpeduncular cistern and right
frontal convexity consistent with CVA.
 
HISTORY OF PRESENT ILLNESS:  The patient admitted to the inpatient rehab for
right hemispheric CVA, subdural hematoma at the interpeduncular cistern and
right frontal convexity.  He is an 84-year-old gentleman who was transferred
from [place] for higher level of care.  He fell 7 days prior to this and was
transferred to [place] on 2019.  He sustained quite a lot of
bruising with sores.  Initial CT of his head was negative at [facility],
began complaining of headache and following the CT showed a small amount of
bleeding or subdural hematoma at the convexity of interpeduncular cistern and
was transferred to [place] after these findings to see a neurosurgeon. 
Repeat head CT also showed this with some attenuation in the right parietal lobe
with increasing right parietal lobe cortex consistent with an age-indeterminate,
likely subacute or early chronic infarct associated with cortical laminar
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  At the level of
superior cerebellar peduncle on the left, there is mild effacement of the left
ambient cistern also.  Currently, the patient has a Cooper catheter.  He has got
a history of BPH.  He has been hypotensive.  He has been having medication
adjusted.  He continues to have a small right pleural effusion.  He had
thoracentesis with 1 liter removed on 2019.  He has been started on Keppra
for possible seizures, restarted on his aspirin as an anticoagulant.  He has
been on Coumadin in the past.  He has had increased weakness, debility, and
impaired mobility.  He is a high fall risk.  He has had self-care deficit. 
These are all barriers to his discharge home.  He lives at home with his wife
and son and his son lives next door to them.  He was moderately independent with
mobility with use of rolling walker, is independent to moderately independent
with ADLs.  Family states that he was having a steady decline over the past
couple of months with difficulty rising from bed to chair.  The patient's family
would like him to return home hopefully at his prior level of functioning if
possible.
 
COMORBIDITIES:  In this patient include acute-on-chronic hypoxic respiratory
failure, bibasilar pneumonia versus atelectasis, COPD, severe pulmonary
hypertension.  He has got acute mental status changes.  He has had a history of
atrial fibrillation and pacemaker placement.  He has got a history of chronic
kidney disease, hypertension, hyperlipidemia, anemia, debility, left
hemiparesis, intracranial bleed, thoracic aneurysm, pulmonary hypertension,
osteoarthritis, and coronary artery disease.
 
PAST MEDICAL HISTORY:  Significant for atrial fibrillation, congestive heart
failure, pulmonary hypertension, pacemaker placement, murmur, aortic aneurysm,
thoracic aneurysm, obstructive sleep apnea, osteoarthritis, COPD, CVA,
pneumonia, home O2 dependence, arthritis, dementia, kidney stones, and BPH.
 
PAST SURGICAL HISTORY:  Includes angioplasty with stents, AAA, pacemaker
placement, gallbladder surgery, and sinus surgery.
 
 
 
HISTORY AND PHYSICAL                           [id number]    LINA HUTCHISON        
 
 
 
ALLERGIES:  ERYTHROMYCIN.
 
CURRENT MEDICATIONS:  Include Flonase nasal spray.  He is on ferrous sulfate 325
mg daily, Colace 100 mg daily, aspirin 325 mg daily, Ventolin 2.5 mg q.6 hours
p.r.n.  He is on Aldactone 25 mg b.i.d., Zocor 40 mg q.h.s., Keppra 500 mg
b.i.d., Celexa 20 mg daily, Advair 1 puff b.i.d., and Tylenol 650 q.4 hours
p.r.n.
 
HABITS:  No current alcohol or tobacco use.
 
FAMILY HISTORY:  Noncontributory.
 
SOCIAL HISTORY:  The patient hopes to return back home.  He actually lives over
in the [facility].
 
REVIEW OF SYSTEMS:
GENERAL:  Does complain of weakness, worse on the left.
HEENT:  Denies cold, cough, or congestion.
CARDIOVASCULAR:  He denies chest pain.
 
PHYSICAL EXAMINATION:
VITAL SIGNS:  Stable, afebrile.
GENERAL:  Elderly gentleman in no acute distress, alert upon exam.
HEENT:  Normocephalic and atraumatic.  Mucosa moist.
NECK:  Supple without adenopathy.
LUNGS:  Clear at this time.
HEART:  Irregular rate and rhythm.
ABDOMEN:  Benign.
EXTREMITIES:  No clubbing, cyanosis or edema.  Does have some noted bruising and
healing areas.
NEUROLOGIC:  He does have noted weakness, especially on the left.
 
LABORATORY DATA:  White count is 7.1, H&H 12 and 36, and platelet count was
noted to be 145.  Sodium 137, potassium 4.0, BUN and creatinine of 17 and 0.6,
and blood sugar is noted to be 78.  His admit UA did show few bacteria and trace
leukocyte esterase.
 
ASSESSMENT:  This is an 84-year-old gentleman admitted to the rehab with a
working diagnosis of CVA.  The patient has potential to make improvement.  We
instituted the following multidisciplinary therapies including, but not limited
to physical, occupational, respiratory, speech, nutritional services,
prosthetics and orthotics.  Given his complex medical condition and risk of more
complications, rehabilitation services cannot be provided at a low level of care
such as a skilled nurse facility.
 
PLAN:
1.  Admit to [facility] for intensive inpatient therapy
to include the following disciplines:
A.  Physical therapy to improve gait, all transfer skills and bed mobility to a
modified independent level.
B.  Occupational therapy to improve activities of daily living to a modified
independent level.
C.  Case management to assist with discharge planning and placement options.
D.  Nutrition to assist with nutritional needs.
 
 
 
HISTORY AND PHYSICAL                           [id number]    LINA HUTCHISON  Rehabilitation nursing to assist in monitoring the patient's underlying
medical conditions and to assist with any type of bowel and bladder management.
2.  The patient's current medication and medical care will be continued.
3.  I will ahead and sign as DNR.
4.  We will go ahead and place him on some Lunesta for sleep.
5.  I am going to follow him up with the care team and staff at noon.
 
TRANSINT:[id number] Voice Confirmation ID: [id number] DOCUMENT ID: [id number]
 
 
PILLO notes whether there has been none or any medical/functional
change since admission:
- No change since preadmission screen.                                  
 
 
PILLO attests patient continues to be appropriate for IRF:
- Continues to be appropriate.                                          
 
 
                                           
                                           MUNA GANDHI MD               
 
 
 
Electronically Signed by MUNA GANDHI on 19 at 1219
 
 
 
 
 
 
 
 
 
 
 
 
 
 
 
 
 
 
 
 
 
 
CC:                                                             [id number]
DICTATION DATE: 19     :     19 09      ADM IN  
                                                                              
[facility]                                          
[address] [place] [address]

## 2019-02-14 VITALS — DIASTOLIC BLOOD PRESSURE: 55 MMHG | SYSTOLIC BLOOD PRESSURE: 92 MMHG

## 2019-02-14 VITALS — DIASTOLIC BLOOD PRESSURE: 64 MMHG | SYSTOLIC BLOOD PRESSURE: 104 MMHG

## 2019-02-15 VITALS — SYSTOLIC BLOOD PRESSURE: 109 MMHG | DIASTOLIC BLOOD PRESSURE: 63 MMHG

## 2019-02-15 VITALS — DIASTOLIC BLOOD PRESSURE: 64 MMHG | SYSTOLIC BLOOD PRESSURE: 104 MMHG

## 2019-02-15 LAB
ANION GAP SERPL CALC-SCNC: 9.5 MMOL/L (ref 8–16)
BASOPHILS NFR BLD AUTO: 0.2 % (ref 0–2)
BUN SERPL-MCNC: 18 MG/DL (ref 7–18)
CALCIUM SERPL-MCNC: 9.4 MG/DL (ref 8.5–10.1)
CHLORIDE SERPL-SCNC: 99 MMOL/L (ref 98–107)
CO2 SERPL-SCNC: 35.1 MMOL/L (ref 21–32)
CREAT SERPL-MCNC: 0.9 MG/DL (ref 0.6–1.3)
EOSINOPHIL NFR BLD: 6.4 % (ref 0–7)
ERYTHROCYTE [DISTWIDTH] IN BLOOD BY AUTOMATED COUNT: 13.5 % (ref 11.5–14.5)
GLUCOSE SERPL-MCNC: 88 MG/DL (ref 74–106)
HCT VFR BLD CALC: 37.8 % (ref 42–54)
HGB BLD-MCNC: 12.4 G/DL (ref 13.5–17.5)
IMM GRANULOCYTES NFR BLD: 0.4 % (ref 0–5)
LYMPHOCYTES NFR BLD AUTO: 21.7 % (ref 15–50)
MCH RBC QN AUTO: 31 PG (ref 26–34)
MCHC RBC AUTO-ENTMCNC: 32.8 G/DL (ref 31–37)
MCV RBC: 94.5 FL (ref 80–100)
MONOCYTES NFR BLD: 10.7 % (ref 2–11)
NEUTROPHILS NFR BLD AUTO: 60.6 % (ref 40–80)
OSMOLALITY SERPL CALC.SUM OF ELEC: 278 MOSM/KG (ref 275–300)
PLATELET # BLD: 161 10X3/UL (ref 130–400)
PMV BLD AUTO: 9.3 FL (ref 7.4–10.4)
POTASSIUM SERPL-SCNC: 4.6 MMOL/L (ref 3.5–5.1)
RBC # BLD AUTO: 4 10X6/UL (ref 4.2–6.1)
SODIUM SERPL-SCNC: 139 MMOL/L (ref 136–145)
WBC # BLD AUTO: 5 10X3/UL (ref 4.8–10.8)

## 2019-02-16 VITALS — SYSTOLIC BLOOD PRESSURE: 93 MMHG | DIASTOLIC BLOOD PRESSURE: 60 MMHG

## 2019-02-17 VITALS — SYSTOLIC BLOOD PRESSURE: 93 MMHG | DIASTOLIC BLOOD PRESSURE: 62 MMHG

## 2019-02-17 VITALS — DIASTOLIC BLOOD PRESSURE: 62 MMHG | SYSTOLIC BLOOD PRESSURE: 102 MMHG

## 2019-02-18 VITALS — SYSTOLIC BLOOD PRESSURE: 105 MMHG | DIASTOLIC BLOOD PRESSURE: 68 MMHG

## 2019-02-18 VITALS — DIASTOLIC BLOOD PRESSURE: 57 MMHG | SYSTOLIC BLOOD PRESSURE: 92 MMHG

## 2019-02-18 LAB
ANION GAP SERPL CALC-SCNC: 9.9 MMOL/L (ref 8–16)
BASOPHILS NFR BLD AUTO: 0.2 % (ref 0–2)
BUN SERPL-MCNC: 17 MG/DL (ref 7–18)
CALCIUM SERPL-MCNC: 9.2 MG/DL (ref 8.5–10.1)
CHLORIDE SERPL-SCNC: 100 MMOL/L (ref 98–107)
CO2 SERPL-SCNC: 32.3 MMOL/L (ref 21–32)
CREAT SERPL-MCNC: 0.8 MG/DL (ref 0.6–1.3)
EOSINOPHIL NFR BLD: 4.8 % (ref 0–7)
ERYTHROCYTE [DISTWIDTH] IN BLOOD BY AUTOMATED COUNT: 13.5 % (ref 11.5–14.5)
GLUCOSE SERPL-MCNC: 87 MG/DL (ref 74–106)
HCT VFR BLD CALC: 39.4 % (ref 42–54)
HGB BLD-MCNC: 13.1 G/DL (ref 13.5–17.5)
IMM GRANULOCYTES NFR BLD: 0.2 % (ref 0–5)
LYMPHOCYTES NFR BLD AUTO: 27.9 % (ref 15–50)
MCH RBC QN AUTO: 31.1 PG (ref 26–34)
MCHC RBC AUTO-ENTMCNC: 33.2 G/DL (ref 31–37)
MCV RBC: 93.6 FL (ref 80–100)
MONOCYTES NFR BLD: 11.7 % (ref 2–11)
NEUTROPHILS NFR BLD AUTO: 55.2 % (ref 40–80)
OSMOLALITY SERPL CALC.SUM OF ELEC: 276 MOSM/KG (ref 275–300)
PLATELET # BLD: 182 10X3/UL (ref 130–400)
PMV BLD AUTO: 9.6 FL (ref 7.4–10.4)
POTASSIUM SERPL-SCNC: 4.2 MMOL/L (ref 3.5–5.1)
RBC # BLD AUTO: 4.21 10X6/UL (ref 4.2–6.1)
SODIUM SERPL-SCNC: 138 MMOL/L (ref 136–145)
WBC # BLD AUTO: 5.5 10X3/UL (ref 4.8–10.8)

## 2019-02-19 VITALS — DIASTOLIC BLOOD PRESSURE: 59 MMHG | SYSTOLIC BLOOD PRESSURE: 98 MMHG

## 2019-02-20 VITALS — SYSTOLIC BLOOD PRESSURE: 96 MMHG | DIASTOLIC BLOOD PRESSURE: 60 MMHG

## 2019-02-21 VITALS — SYSTOLIC BLOOD PRESSURE: 107 MMHG | DIASTOLIC BLOOD PRESSURE: 65 MMHG
